# Patient Record
Sex: FEMALE | Race: WHITE | HISPANIC OR LATINO
[De-identification: names, ages, dates, MRNs, and addresses within clinical notes are randomized per-mention and may not be internally consistent; named-entity substitution may affect disease eponyms.]

---

## 2024-03-05 ENCOUNTER — NON-APPOINTMENT (OUTPATIENT)
Age: 34
End: 2024-03-05

## 2024-03-06 ENCOUNTER — TRANSCRIPTION ENCOUNTER (OUTPATIENT)
Age: 34
End: 2024-03-06

## 2024-03-06 ENCOUNTER — APPOINTMENT (OUTPATIENT)
Dept: OBGYN | Facility: CLINIC | Age: 34
End: 2024-03-06
Payer: COMMERCIAL

## 2024-03-06 VITALS
HEART RATE: 67 BPM | WEIGHT: 145 LBS | BODY MASS INDEX: 29.23 KG/M2 | HEIGHT: 59 IN | OXYGEN SATURATION: 100 % | SYSTOLIC BLOOD PRESSURE: 111 MMHG | DIASTOLIC BLOOD PRESSURE: 71 MMHG

## 2024-03-06 DIAGNOSIS — Z83.79 FAMILY HISTORY OF OTHER DISEASES OF THE DIGESTIVE SYSTEM: ICD-10-CM

## 2024-03-06 DIAGNOSIS — Z32.00 ENCOUNTER FOR PREGNANCY TEST, RESULT UNKNOWN: ICD-10-CM

## 2024-03-06 DIAGNOSIS — Z83.3 FAMILY HISTORY OF DIABETES MELLITUS: ICD-10-CM

## 2024-03-06 DIAGNOSIS — Z82.49 FAMILY HISTORY OF ISCHEMIC HEART DISEASE AND OTHER DISEASES OF THE CIRCULATORY SYSTEM: ICD-10-CM

## 2024-03-06 DIAGNOSIS — Z33.2 ENCOUNTER FOR ELECTIVE TERMINATION OF PREGNANCY: ICD-10-CM

## 2024-03-06 DIAGNOSIS — Z78.9 OTHER SPECIFIED HEALTH STATUS: ICD-10-CM

## 2024-03-06 DIAGNOSIS — N89.8 OTHER SPECIFIED NONINFLAMMATORY DISORDERS OF VAGINA: ICD-10-CM

## 2024-03-06 PROBLEM — Z00.00 ENCOUNTER FOR PREVENTIVE HEALTH EXAMINATION: Status: ACTIVE | Noted: 2024-03-06

## 2024-03-06 PROCEDURE — 99204 OFFICE O/P NEW MOD 45 MIN: CPT | Mod: 25

## 2024-03-06 PROCEDURE — 76830 TRANSVAGINAL US NON-OB: CPT

## 2024-03-06 NOTE — HISTORY OF PRESENT ILLNESS
[Patient reported PAP Smear was normal] : Patient reported PAP Smear was normal [N] : Patient denies prior pregnancies [Currently Active] : currently active [Men] : men [No] : No [PapSmeardate] : 2023 [FreeTextEntry1] : 01/27/2024

## 2024-03-10 PROBLEM — Z33.2 ENCOUNTER FOR ELECTIVE TERMINATION OF PREGNANCY: Status: ACTIVE | Noted: 2024-03-10

## 2024-03-10 PROBLEM — Z32.00 VISIT FOR CONFIRMATION OF PREGNANCY TEST RESULT WITH PHYSICAL EXAM: Status: ACTIVE | Noted: 2024-03-10

## 2024-03-10 NOTE — PROCEDURE
[Transvaginal OB Sonogram] : Transvaginal OB Sonogram [Intrauterine Pregnancy] : intrauterine pregnancy [Fetal Heart] : fetal heart present [Transvaginal OB Sonogram WNL] : Transvaginal OB Sonogram WNL

## 2024-03-10 NOTE — PHYSICAL EXAM
[Chaperone Present] : A chaperone was present in the examining room during all aspects of the physical examination [Alert] : alert [Appropriately responsive] : appropriately responsive [No Lymphadenopathy] : no lymphadenopathy [No Acute Distress] : no acute distress [Non-tender] : non-tender [Soft] : soft [Non-distended] : non-distended [No Lesions] : no lesions [No HSM] : No HSM [No Mass] : no mass [Oriented x3] : oriented x3 [Labia Majora] : normal [Labia Minora] : normal [Normal] : normal [Uterine Adnexae] : normal

## 2024-03-10 NOTE — COUNSELING
[Medication Management] : medication management [Pregnancy Options] : pregnancy options swelling of legs

## 2024-03-12 ENCOUNTER — TRANSCRIPTION ENCOUNTER (OUTPATIENT)
Age: 34
End: 2024-03-12

## 2024-03-15 DIAGNOSIS — B37.9 CANDIDIASIS, UNSPECIFIED: ICD-10-CM

## 2024-03-15 LAB
A VAGINAE DNA VAG QL NAA+PROBE: NORMAL
BVAB2 DNA VAG QL NAA+PROBE: NORMAL
C KRUSEI DNA VAG QL NAA+PROBE: NEGATIVE
C KRUSEI DNA VAG QL NAA+PROBE: POSITIVE
C TRACH RRNA SPEC QL NAA+PROBE: NEGATIVE
CANDIDA DNA VAG QL NAA+PROBE: NEGATIVE
MEGA1 DNA VAG QL NAA+PROBE: NORMAL
N GONORRHOEA RRNA SPEC QL NAA+PROBE: NEGATIVE
T VAGINALIS RRNA SPEC QL NAA+PROBE: NEGATIVE

## 2024-03-15 RX ORDER — FLUCONAZOLE 150 MG/1
150 TABLET ORAL
Qty: 3 | Refills: 0 | Status: ACTIVE | COMMUNITY
Start: 2024-03-15 | End: 1900-01-01

## 2024-03-22 ENCOUNTER — APPOINTMENT (OUTPATIENT)
Dept: OBGYN | Facility: CLINIC | Age: 34
End: 2024-03-22
Payer: COMMERCIAL

## 2024-03-22 VITALS — DIASTOLIC BLOOD PRESSURE: 67 MMHG | OXYGEN SATURATION: 97 % | SYSTOLIC BLOOD PRESSURE: 107 MMHG | HEART RATE: 77 BPM

## 2024-03-22 DIAGNOSIS — N92.6 IRREGULAR MENSTRUATION, UNSPECIFIED: ICD-10-CM

## 2024-03-22 DIAGNOSIS — O03.4 INCOMPLETE SPONTANEOUS ABORTION W/OUT COMPLICATION: ICD-10-CM

## 2024-03-22 PROCEDURE — 76830 TRANSVAGINAL US NON-OB: CPT

## 2024-03-22 PROCEDURE — 99213 OFFICE O/P EST LOW 20 MIN: CPT | Mod: 25

## 2024-03-22 PROCEDURE — 36415 COLL VENOUS BLD VENIPUNCTURE: CPT

## 2024-03-22 RX ORDER — MISOPROSTOL 200 UG/1
200 TABLET ORAL
Qty: 4 | Refills: 0 | Status: ACTIVE | COMMUNITY
Start: 2024-03-22 | End: 1900-01-01

## 2024-03-28 LAB — ABO + RH PNL BLD: NORMAL

## 2024-04-03 ENCOUNTER — TRANSCRIPTION ENCOUNTER (OUTPATIENT)
Age: 34
End: 2024-04-03

## 2024-04-22 ENCOUNTER — APPOINTMENT (OUTPATIENT)
Dept: OBGYN | Facility: CLINIC | Age: 34
End: 2024-04-22

## 2024-04-22 VITALS
WEIGHT: 145 LBS | BODY MASS INDEX: 29.29 KG/M2 | DIASTOLIC BLOOD PRESSURE: 79 MMHG | HEART RATE: 78 BPM | SYSTOLIC BLOOD PRESSURE: 116 MMHG | OXYGEN SATURATION: 100 %

## 2024-04-22 PROCEDURE — 99213 OFFICE O/P EST LOW 20 MIN: CPT | Mod: 25

## 2024-04-22 PROCEDURE — 36415 COLL VENOUS BLD VENIPUNCTURE: CPT

## 2024-04-22 PROCEDURE — 76830 TRANSVAGINAL US NON-OB: CPT

## 2024-04-23 LAB — HCG SERPL-MCNC: 438 MIU/ML

## 2024-06-20 PROBLEM — N92.6 MISSED MENSES: Status: ACTIVE | Noted: 2024-03-10

## 2024-06-20 NOTE — PROCEDURE
[F/U Medical ] : f/u medical  [Transvaginal Ultrasound] : transvaginal ultrasound [Color Doppler Imaging] : color doppler imaging [FreeTextEntry4] : Still some suspected retained POC, color doppler showed flow. Both ovaries visualized and appear to be normal; no cysts No adnexal masses. No free fluid Suspect incomplete

## 2024-06-20 NOTE — PHYSICAL EXAM
[Appropriately responsive] : appropriately responsive [Alert] : alert [No Acute Distress] : no acute distress [Soft] : soft [Non-tender] : non-tender [Non-distended] : non-distended [No HSM] : No HSM [No Lesions] : no lesions [No Mass] : no mass [Oriented x3] : oriented x3 [FreeTextEntry5] : No increased work of breathing noted [Labia Majora] : normal [Labia Minora] : normal [Normal] : normal [Uterine Adnexae] : normal

## 2024-06-20 NOTE — HISTORY OF PRESENT ILLNESS
[FreeTextEntry1] : Patient presents for a follow up visit. LMP:01/27/2024, regular cycles, q q 21 days in between Pt desired termination of pregnancy and is s/p mifepristone and misoprostol. Now here for f/u u/s. Denies VB now. Denies fever, chills, pelvic pain and abnormal vaginal discharge.

## 2024-06-20 NOTE — COUNSELING
[Vitamins/Supplements] : vitamins/supplements [Preconception Care/ Fertility options] : preconception care, fertility options [Pregnancy Options] : pregnancy options

## 2024-06-20 NOTE — PLAN
[FreeTextEntry1] : Here for f/u of medical . LMP 24 s/p mifepristone and misoprostol 3/10/24 No VB currently and no signs of infection. TVUS done today - Still some suspected retained POC, color doppler showed flow. Both ovaries visualized and appear to be normal; no cysts No adnexal masses. No free fluid Suspect incomplete   R/B/A of medical vs. surgical management of suspected retained POC. Pt had reported blood type to be Rh positive at last visit.  But was unable to find the document.  Blood type drawn today. After discussion, pt would like another round of misoprostol and will return in 1-2 weeks for repeat u/s. Rx misoprostol sent to pharmacy.

## 2024-08-04 PROBLEM — O03.9 COMPLETE ABORTION: Status: ACTIVE | Noted: 2024-08-04

## 2024-08-14 ENCOUNTER — APPOINTMENT (OUTPATIENT)
Dept: OBGYN | Facility: CLINIC | Age: 34
End: 2024-08-14

## 2024-08-14 VITALS
DIASTOLIC BLOOD PRESSURE: 81 MMHG | BODY MASS INDEX: 30.44 KG/M2 | HEART RATE: 82 BPM | WEIGHT: 151 LBS | OXYGEN SATURATION: 99 % | SYSTOLIC BLOOD PRESSURE: 128 MMHG | HEIGHT: 59 IN

## 2024-08-14 DIAGNOSIS — N89.8 OTHER SPECIFIED NONINFLAMMATORY DISORDERS OF VAGINA: ICD-10-CM

## 2024-08-14 DIAGNOSIS — N92.6 IRREGULAR MENSTRUATION, UNSPECIFIED: ICD-10-CM

## 2024-08-14 DIAGNOSIS — N92.0 EXCESSIVE AND FREQUENT MENSTRUATION WITH REGULAR CYCLE: ICD-10-CM

## 2024-08-14 DIAGNOSIS — B37.9 CANDIDIASIS, UNSPECIFIED: ICD-10-CM

## 2024-08-14 PROCEDURE — 36415 COLL VENOUS BLD VENIPUNCTURE: CPT

## 2024-08-14 PROCEDURE — 99214 OFFICE O/P EST MOD 30 MIN: CPT | Mod: 25

## 2024-08-14 PROCEDURE — 76830 TRANSVAGINAL US NON-OB: CPT

## 2024-08-14 RX ORDER — MEDROXYPROGESTERONE ACETATE 10 MG/1
10 TABLET ORAL DAILY
Qty: 10 | Refills: 0 | Status: ACTIVE | COMMUNITY
Start: 2024-08-14 | End: 1900-01-01

## 2024-08-14 NOTE — HISTORY OF PRESENT ILLNESS
[FreeTextEntry1] : 33 yo patient presents for follow up from medical . Since taking the medication she has had irregular bleeding from March-July. She describes constant spotting.  Thinks she had regular period 2024. Also c/o increased vaginal discharge - had odor to it, itching - 2 days ago.

## 2024-08-19 LAB
25(OH)D3 SERPL-MCNC: 19.3 NG/ML
A VAGINAE DNA VAG QL NAA+PROBE: NORMAL
ALBUMIN SERPL ELPH-MCNC: 4.5 G/DL
ALP BLD-CCNC: 67 U/L
ALT SERPL-CCNC: 21 U/L
ANION GAP SERPL CALC-SCNC: 11 MMOL/L
AST SERPL-CCNC: 15 U/L
BASOPHILS # BLD AUTO: 0.1 K/UL
BASOPHILS NFR BLD AUTO: 0.8 %
BILIRUB SERPL-MCNC: 0.6 MG/DL
BUN SERPL-MCNC: 17 MG/DL
BVAB2 DNA VAG QL NAA+PROBE: NORMAL
C KRUSEI DNA VAG QL NAA+PROBE: NEGATIVE
C KRUSEI DNA VAG QL NAA+PROBE: POSITIVE
C TRACH RRNA SPEC QL NAA+PROBE: NEGATIVE
CALCIUM SERPL-MCNC: 9.9 MG/DL
CANDIDA DNA VAG QL NAA+PROBE: NEGATIVE
CHLORIDE SERPL-SCNC: 105 MMOL/L
CHOLEST SERPL-MCNC: 241 MG/DL
CO2 SERPL-SCNC: 22 MMOL/L
CREAT SERPL-MCNC: 0.68 MG/DL
EGFR: 118 ML/MIN/1.73M2
EOSINOPHIL # BLD AUTO: 0.12 K/UL
EOSINOPHIL NFR BLD AUTO: 0.9 %
ESTIMATED AVERAGE GLUCOSE: 105 MG/DL
GLUCOSE SERPL-MCNC: 97 MG/DL
HBA1C MFR BLD HPLC: 5.3 %
HCG SERPL-MCNC: <1 MIU/ML
HCT VFR BLD CALC: 45.9 %
HDLC SERPL-MCNC: 77 MG/DL
HGB BLD-MCNC: 13.4 G/DL
IMM GRANULOCYTES NFR BLD AUTO: 0.3 %
IRON SATN MFR SERPL: 24 %
IRON SERPL-MCNC: 78 UG/DL
LDLC SERPL CALC-MCNC: 153 MG/DL
LYMPHOCYTES # BLD AUTO: 4.13 K/UL
LYMPHOCYTES NFR BLD AUTO: 31.7 %
MAN DIFF?: NORMAL
MCHC RBC-ENTMCNC: 29.2 GM/DL
MCHC RBC-ENTMCNC: 29.5 PG
MCV RBC AUTO: 100.9 FL
MEGA1 DNA VAG QL NAA+PROBE: NORMAL
MONOCYTES # BLD AUTO: 0.66 K/UL
MONOCYTES NFR BLD AUTO: 5.1 %
N GONORRHOEA RRNA SPEC QL NAA+PROBE: NEGATIVE
NEUTROPHILS # BLD AUTO: 7.99 K/UL
NEUTROPHILS NFR BLD AUTO: 61.2 %
NONHDLC SERPL-MCNC: 163 MG/DL
PLATELET # BLD AUTO: 361 K/UL
POTASSIUM SERPL-SCNC: 4.7 MMOL/L
PROGEST SERPL-MCNC: 15.4 NG/ML
PROT SERPL-MCNC: 7.1 G/DL
RBC # BLD: 4.55 M/UL
RBC # FLD: 14.2 %
SODIUM SERPL-SCNC: 138 MMOL/L
T VAGINALIS RRNA SPEC QL NAA+PROBE: NEGATIVE
TIBC SERPL-MCNC: 321 UG/DL
TRIGL SERPL-MCNC: 60 MG/DL
TSH SERPL-ACNC: 2.18 UIU/ML
UIBC SERPL-MCNC: 243 UG/DL
VIT B12 SERPL-MCNC: 490 PG/ML
WBC # FLD AUTO: 13.04 K/UL

## 2024-10-28 ENCOUNTER — APPOINTMENT (OUTPATIENT)
Dept: OBGYN | Facility: CLINIC | Age: 34
End: 2024-10-28

## 2024-10-28 ENCOUNTER — NON-APPOINTMENT (OUTPATIENT)
Age: 34
End: 2024-10-28

## 2024-10-28 VITALS
OXYGEN SATURATION: 100 % | WEIGHT: 155 LBS | DIASTOLIC BLOOD PRESSURE: 70 MMHG | HEART RATE: 76 BPM | BODY MASS INDEX: 31.31 KG/M2 | SYSTOLIC BLOOD PRESSURE: 111 MMHG

## 2024-10-28 DIAGNOSIS — N92.6 IRREGULAR MENSTRUATION, UNSPECIFIED: ICD-10-CM

## 2024-10-28 DIAGNOSIS — Z32.00 ENCOUNTER FOR PREGNANCY TEST, RESULT UNKNOWN: ICD-10-CM

## 2024-10-28 DIAGNOSIS — B37.9 CANDIDIASIS, UNSPECIFIED: ICD-10-CM

## 2024-10-28 DIAGNOSIS — N89.8 OTHER SPECIFIED NONINFLAMMATORY DISORDERS OF VAGINA: ICD-10-CM

## 2024-10-28 DIAGNOSIS — Z13.71 ENCOUNTER FOR NONPROCREATIVE SCREENING FOR GENETIC DISEASE CARRIER STATUS: ICD-10-CM

## 2024-10-28 PROCEDURE — 81025 URINE PREGNANCY TEST: CPT

## 2024-10-28 PROCEDURE — 99214 OFFICE O/P EST MOD 30 MIN: CPT | Mod: 25

## 2024-10-28 PROCEDURE — 36415 COLL VENOUS BLD VENIPUNCTURE: CPT

## 2024-10-28 PROCEDURE — 76830 TRANSVAGINAL US NON-OB: CPT

## 2024-10-28 RX ORDER — MICONAZOLE NITRATE 4 %
2 CREAM WITH PREFILLED APPLICATOR VAGINAL
Qty: 1 | Refills: 0 | Status: ACTIVE | COMMUNITY
Start: 2024-10-28 | End: 1900-01-01

## 2024-10-29 LAB
ABO + RH PNL BLD: NORMAL
ALBUMIN SERPL ELPH-MCNC: 4.5 G/DL
ALP BLD-CCNC: 55 U/L
ALT SERPL-CCNC: 18 U/L
ANION GAP SERPL CALC-SCNC: 14 MMOL/L
AST SERPL-CCNC: 17 U/L
BILIRUB SERPL-MCNC: 0.4 MG/DL
BLD GP AB SCN SERPL QL: NORMAL
BUN SERPL-MCNC: 12 MG/DL
C TRACH RRNA SPEC QL NAA+PROBE: NOT DETECTED
CALCIUM SERPL-MCNC: 9.6 MG/DL
CHLORIDE SERPL-SCNC: 103 MMOL/L
CMV IGG SERPL QL: 1.6 U/ML
CMV IGG SERPL-IMP: POSITIVE
CMV IGM SERPL QL: <8 AU/ML
CMV IGM SERPL QL: NEGATIVE
CO2 SERPL-SCNC: 20 MMOL/L
CREAT SERPL-MCNC: 0.54 MG/DL
EGFR: 125 ML/MIN/1.73M2
ESTIMATED AVERAGE GLUCOSE: 97 MG/DL
GLUCOSE SERPL-MCNC: 82 MG/DL
HBA1C MFR BLD HPLC: 5 %
HBV SURFACE AG SER QL: NONREACTIVE
HCT VFR BLD CALC: 42.3 %
HCV AB SER QL: NONREACTIVE
HCV RNA SERPL NAA+PROBE-LOG IU: NOT DETECTED LOGIU/ML
HCV S/CO RATIO: 0.08 S/CO
HEPC RNA INTERP: NOT DETECTED
HGB A MFR BLD: 97.2 %
HGB A2 MFR BLD: 2.8 %
HGB BLD-MCNC: 13.7 G/DL
HGB FRACT BLD-IMP: NORMAL
HIV1+2 AB SPEC QL IA.RAPID: NONREACTIVE
MCHC RBC-ENTMCNC: 31.7 PG
MCHC RBC-ENTMCNC: 32.4 GM/DL
MCV RBC AUTO: 97.9 FL
MEV IGG FLD QL IA: >300 AU/ML
MEV IGG+IGM SER-IMP: POSITIVE
MUV AB SER-ACNC: POSITIVE
MUV IGG SER QL IA: 102 AU/ML
N GONORRHOEA RRNA SPEC QL NAA+PROBE: NOT DETECTED
PLATELET # BLD AUTO: 369 K/UL
POTASSIUM SERPL-SCNC: 4.2 MMOL/L
PROT SERPL-MCNC: 7.1 G/DL
RBC # BLD: 4.32 M/UL
RBC # FLD: 13.3 %
RUBV IGG FLD-ACNC: 3.16 INDEX
RUBV IGG SER-IMP: POSITIVE
SODIUM SERPL-SCNC: 138 MMOL/L
SOURCE AMPLIFICATION: NORMAL
T GONDII AB SER-IMP: NEGATIVE
T GONDII AB SER-IMP: NEGATIVE
T GONDII IGG SER QL: <3 IU/ML
T GONDII IGM SER QL: <3 AU/ML
T PALLIDUM AB SER QL IA: NEGATIVE
TSH SERPL-ACNC: 1.55 UIU/ML
VZV AB TITR SER: POSITIVE
VZV IGG SER IF-ACNC: 12.8 S/CO
WBC # FLD AUTO: 10.66 K/UL

## 2024-10-30 LAB
B19V IGG SER QL IA: 0.25 INDEX
B19V IGG+IGM SER-IMP: NEGATIVE
B19V IGG+IGM SER-IMP: NORMAL
B19V IGM FLD-ACNC: 0.21 INDEX
B19V IGM SER-ACNC: NEGATIVE

## 2024-10-31 LAB — BACTERIA UR CULT: ABNORMAL

## 2024-11-18 ENCOUNTER — TRANSCRIPTION ENCOUNTER (OUTPATIENT)
Age: 34
End: 2024-11-18

## 2024-11-20 ENCOUNTER — APPOINTMENT (OUTPATIENT)
Dept: OBGYN | Facility: CLINIC | Age: 34
End: 2024-11-20
Payer: COMMERCIAL

## 2024-11-20 VITALS
SYSTOLIC BLOOD PRESSURE: 111 MMHG | HEART RATE: 90 BPM | DIASTOLIC BLOOD PRESSURE: 71 MMHG | BODY MASS INDEX: 31.31 KG/M2 | OXYGEN SATURATION: 98 % | WEIGHT: 155 LBS

## 2024-11-20 DIAGNOSIS — N89.8 OTHER SPECIFIED NONINFLAMMATORY DISORDERS OF VAGINA: ICD-10-CM

## 2024-11-20 DIAGNOSIS — B37.9 CANDIDIASIS, UNSPECIFIED: ICD-10-CM

## 2024-11-20 DIAGNOSIS — O23.40 UNSPECIFIED INFECTION OF URINARY TRACT IN PREGNANCY, UNSPECIFIED TRIMESTER: ICD-10-CM

## 2024-11-20 PROCEDURE — 0502F SUBSEQUENT PRENATAL CARE: CPT

## 2024-11-22 LAB
BV BACTERIA RRNA VAG QL NAA+PROBE: NOT DETECTED
C GLABRATA RNA VAG QL NAA+PROBE: NOT DETECTED
CANDIDA RRNA VAG QL PROBE: NOT DETECTED
T VAGINALIS RRNA SPEC QL NAA+PROBE: NOT DETECTED

## 2024-11-24 LAB — BACTERIA UR CULT: NORMAL

## 2024-12-02 ENCOUNTER — NON-APPOINTMENT (OUTPATIENT)
Age: 34
End: 2024-12-02

## 2024-12-02 ENCOUNTER — TRANSCRIPTION ENCOUNTER (OUTPATIENT)
Age: 34
End: 2024-12-02

## 2024-12-03 ENCOUNTER — TRANSCRIPTION ENCOUNTER (OUTPATIENT)
Age: 34
End: 2024-12-03

## 2024-12-03 RX ORDER — CEPHALEXIN 500 MG/1
500 CAPSULE ORAL
Qty: 14 | Refills: 0 | Status: ACTIVE | COMMUNITY
Start: 2024-12-03 | End: 1900-01-01

## 2024-12-05 ENCOUNTER — ASOB RESULT (OUTPATIENT)
Age: 34
End: 2024-12-05

## 2024-12-05 ENCOUNTER — APPOINTMENT (OUTPATIENT)
Dept: ANTEPARTUM | Facility: CLINIC | Age: 34
End: 2024-12-05
Payer: COMMERCIAL

## 2024-12-05 PROCEDURE — 76813 OB US NUCHAL MEAS 1 GEST: CPT

## 2024-12-05 PROCEDURE — 93976 VASCULAR STUDY: CPT

## 2024-12-05 PROCEDURE — 76801 OB US < 14 WKS SINGLE FETUS: CPT | Mod: 59

## 2024-12-17 ENCOUNTER — APPOINTMENT (OUTPATIENT)
Dept: OBGYN | Facility: CLINIC | Age: 34
End: 2024-12-17
Payer: COMMERCIAL

## 2024-12-17 PROCEDURE — 0502F SUBSEQUENT PRENATAL CARE: CPT

## 2025-01-14 ENCOUNTER — APPOINTMENT (OUTPATIENT)
Dept: OBGYN | Facility: CLINIC | Age: 35
End: 2025-01-14

## 2025-01-14 VITALS
OXYGEN SATURATION: 98 % | DIASTOLIC BLOOD PRESSURE: 74 MMHG | BODY MASS INDEX: 34.13 KG/M2 | HEART RATE: 84 BPM | SYSTOLIC BLOOD PRESSURE: 113 MMHG | WEIGHT: 169 LBS

## 2025-01-14 DIAGNOSIS — Z13.79 ENCOUNTER FOR OTHER SCREENING FOR GENETIC AND CHROMOSOMAL ANOMALIES: ICD-10-CM

## 2025-01-14 DIAGNOSIS — Z34.02 ENCOUNTER FOR SUPERVISION OF NORMAL FIRST PREGNANCY, SECOND TRIMESTER: ICD-10-CM

## 2025-01-14 PROCEDURE — 36415 COLL VENOUS BLD VENIPUNCTURE: CPT

## 2025-01-14 PROCEDURE — 0502F SUBSEQUENT PRENATAL CARE: CPT

## 2025-01-15 ENCOUNTER — TRANSCRIPTION ENCOUNTER (OUTPATIENT)
Age: 35
End: 2025-01-15

## 2025-01-15 LAB
AFP INTERP SERPL-IMP: NORMAL
AFP INTERP SERPL-IMP: NORMAL
AFP MOM CUT-OFF: 2.5
AFP MOM SERPL: 0.98
AFP PERCENTILE: 47.3
AFP SERPL-ACNC: 41.48 NG/ML
CARBAMAZEPINE?: NO
CURRENT SMOKER: NORMAL
DIABETES STATUS PATIENT: NORMAL
GA: NORMAL
GESTATIONAL AGE METHOD: NORMAL
HX OF NTD NARR: NORMAL
MULTIPLE PREGNANCY: NORMAL
NEURAL TUBE DEFECT RISK FETUS: NORMAL
NEURAL TUBE DEFECT RISK POP: NORMAL
RECOM F/U: NO
TEST PERFORMANCE INFO SPEC: NORMAL
VALPROIC ACID?: NORMAL

## 2025-01-28 ENCOUNTER — ASOB RESULT (OUTPATIENT)
Age: 35
End: 2025-01-28

## 2025-01-28 ENCOUNTER — APPOINTMENT (OUTPATIENT)
Dept: ANTEPARTUM | Facility: CLINIC | Age: 35
End: 2025-01-28

## 2025-01-28 PROCEDURE — 76817 TRANSVAGINAL US OBSTETRIC: CPT

## 2025-01-28 PROCEDURE — 76811 OB US DETAILED SNGL FETUS: CPT

## 2025-02-11 ENCOUNTER — NON-APPOINTMENT (OUTPATIENT)
Age: 35
End: 2025-02-11

## 2025-02-11 ENCOUNTER — APPOINTMENT (OUTPATIENT)
Dept: OBGYN | Facility: CLINIC | Age: 35
End: 2025-02-11
Payer: COMMERCIAL

## 2025-02-11 VITALS
HEART RATE: 114 BPM | BODY MASS INDEX: 35.35 KG/M2 | SYSTOLIC BLOOD PRESSURE: 111 MMHG | WEIGHT: 175 LBS | OXYGEN SATURATION: 97 % | DIASTOLIC BLOOD PRESSURE: 71 MMHG

## 2025-02-11 PROCEDURE — 0502F SUBSEQUENT PRENATAL CARE: CPT

## 2025-02-26 ENCOUNTER — APPOINTMENT (OUTPATIENT)
Dept: OBGYN | Facility: CLINIC | Age: 35
End: 2025-02-26
Payer: COMMERCIAL

## 2025-02-26 VITALS
OXYGEN SATURATION: 97 % | SYSTOLIC BLOOD PRESSURE: 120 MMHG | DIASTOLIC BLOOD PRESSURE: 75 MMHG | BODY MASS INDEX: 35.14 KG/M2 | WEIGHT: 174 LBS | HEART RATE: 95 BPM

## 2025-02-26 DIAGNOSIS — O03.9 COMPLETE OR UNSPECIFIED SPONTANEOUS ABORTION W/OUT COMPLICATION: ICD-10-CM

## 2025-02-26 DIAGNOSIS — O03.4 INCOMPLETE SPONTANEOUS ABORTION W/OUT COMPLICATION: ICD-10-CM

## 2025-02-26 DIAGNOSIS — Z33.2 ENCOUNTER FOR ELECTIVE TERMINATION OF PREGNANCY: ICD-10-CM

## 2025-02-26 DIAGNOSIS — O23.40 UNSPECIFIED INFECTION OF URINARY TRACT IN PREGNANCY, UNSPECIFIED TRIMESTER: ICD-10-CM

## 2025-02-26 DIAGNOSIS — Z34.02 ENCOUNTER FOR SUPERVISION OF NORMAL FIRST PREGNANCY, SECOND TRIMESTER: ICD-10-CM

## 2025-02-26 DIAGNOSIS — Z13.1 ENCOUNTER FOR SCREENING FOR DIABETES MELLITUS: ICD-10-CM

## 2025-02-26 DIAGNOSIS — Z87.42 PERSONAL HISTORY OF OTHER DISEASES OF THE FEMALE GENITAL TRACT: ICD-10-CM

## 2025-02-26 DIAGNOSIS — N92.0 EXCESSIVE AND FREQUENT MENSTRUATION WITH REGULAR CYCLE: ICD-10-CM

## 2025-02-26 PROCEDURE — 0502F SUBSEQUENT PRENATAL CARE: CPT

## 2025-02-26 PROCEDURE — 81003 URINALYSIS AUTO W/O SCOPE: CPT | Mod: QW

## 2025-02-26 PROCEDURE — 36415 COLL VENOUS BLD VENIPUNCTURE: CPT

## 2025-02-27 ENCOUNTER — APPOINTMENT (OUTPATIENT)
Dept: ANTEPARTUM | Facility: CLINIC | Age: 35
End: 2025-02-27
Payer: COMMERCIAL

## 2025-02-27 ENCOUNTER — ASOB RESULT (OUTPATIENT)
Age: 35
End: 2025-02-27

## 2025-02-27 PROCEDURE — 76816 OB US FOLLOW-UP PER FETUS: CPT

## 2025-02-27 PROCEDURE — 76817 TRANSVAGINAL US OBSTETRIC: CPT

## 2025-03-03 ENCOUNTER — TRANSCRIPTION ENCOUNTER (OUTPATIENT)
Age: 35
End: 2025-03-03

## 2025-03-03 LAB
GLUCOSE 1H P 50 G GLC PO SERPL-MCNC: 111 MG/DL
HCT VFR BLD CALC: 38 %
HGB BLD-MCNC: 11.8 G/DL
MCHC RBC-ENTMCNC: 30.9 PG
MCHC RBC-ENTMCNC: 31.1 G/DL
MCV RBC AUTO: 99.5 FL
PLATELET # BLD AUTO: 403 K/UL
RBC # BLD: 3.82 M/UL
RBC # FLD: 14.2 %
T PALLIDUM AB SER QL IA: NEGATIVE
WBC # FLD AUTO: 12.55 K/UL

## 2025-03-07 ENCOUNTER — TRANSCRIPTION ENCOUNTER (OUTPATIENT)
Age: 35
End: 2025-03-07

## 2025-03-26 ENCOUNTER — NON-APPOINTMENT (OUTPATIENT)
Age: 35
End: 2025-03-26

## 2025-03-26 ENCOUNTER — APPOINTMENT (OUTPATIENT)
Dept: OBGYN | Facility: CLINIC | Age: 35
End: 2025-03-26
Payer: COMMERCIAL

## 2025-03-26 VITALS
HEIGHT: 59 IN | WEIGHT: 177 LBS | DIASTOLIC BLOOD PRESSURE: 74 MMHG | HEART RATE: 77 BPM | SYSTOLIC BLOOD PRESSURE: 110 MMHG | BODY MASS INDEX: 35.68 KG/M2 | OXYGEN SATURATION: 98 %

## 2025-03-26 DIAGNOSIS — Z23 ENCOUNTER FOR IMMUNIZATION: ICD-10-CM

## 2025-03-26 PROCEDURE — 0502F SUBSEQUENT PRENATAL CARE: CPT

## 2025-04-15 ENCOUNTER — APPOINTMENT (OUTPATIENT)
Dept: OBGYN | Facility: CLINIC | Age: 35
End: 2025-04-15
Payer: COMMERCIAL

## 2025-04-15 PROCEDURE — 0502F SUBSEQUENT PRENATAL CARE: CPT

## 2025-04-21 ENCOUNTER — APPOINTMENT (OUTPATIENT)
Dept: ANTEPARTUM | Facility: CLINIC | Age: 35
End: 2025-04-21
Payer: COMMERCIAL

## 2025-04-21 ENCOUNTER — ASOB RESULT (OUTPATIENT)
Age: 35
End: 2025-04-21

## 2025-04-21 PROCEDURE — 76820 UMBILICAL ARTERY ECHO: CPT | Mod: 59

## 2025-04-21 PROCEDURE — 76819 FETAL BIOPHYS PROFIL W/O NST: CPT | Mod: 59

## 2025-04-21 PROCEDURE — 76816 OB US FOLLOW-UP PER FETUS: CPT

## 2025-04-23 ENCOUNTER — APPOINTMENT (OUTPATIENT)
Dept: OBGYN | Facility: CLINIC | Age: 35
End: 2025-04-23
Payer: COMMERCIAL

## 2025-04-23 PROCEDURE — 36415 COLL VENOUS BLD VENIPUNCTURE: CPT

## 2025-04-24 ENCOUNTER — NON-APPOINTMENT (OUTPATIENT)
Age: 35
End: 2025-04-24

## 2025-04-24 LAB — GLUCOSE 1H P 50 G GLC PO SERPL-MCNC: 163 MG/DL

## 2025-04-25 ENCOUNTER — APPOINTMENT (OUTPATIENT)
Dept: OBGYN | Facility: CLINIC | Age: 35
End: 2025-04-25
Payer: COMMERCIAL

## 2025-04-25 DIAGNOSIS — O99.810 ABNORMAL GLUCOSE COMPLICATING PREGNANCY: ICD-10-CM

## 2025-04-25 PROCEDURE — 36415 COLL VENOUS BLD VENIPUNCTURE: CPT

## 2025-05-05 ENCOUNTER — NON-APPOINTMENT (OUTPATIENT)
Age: 35
End: 2025-05-05

## 2025-05-05 ENCOUNTER — TRANSCRIPTION ENCOUNTER (OUTPATIENT)
Age: 35
End: 2025-05-05

## 2025-05-07 ENCOUNTER — NON-APPOINTMENT (OUTPATIENT)
Age: 35
End: 2025-05-07

## 2025-05-07 ENCOUNTER — APPOINTMENT (OUTPATIENT)
Dept: OBGYN | Facility: CLINIC | Age: 35
End: 2025-05-07
Payer: COMMERCIAL

## 2025-05-07 VITALS
BODY MASS INDEX: 37.77 KG/M2 | SYSTOLIC BLOOD PRESSURE: 97 MMHG | WEIGHT: 187 LBS | DIASTOLIC BLOOD PRESSURE: 61 MMHG | HEART RATE: 100 BPM | OXYGEN SATURATION: 98 %

## 2025-05-07 PROCEDURE — 0502F SUBSEQUENT PRENATAL CARE: CPT

## 2025-05-08 ENCOUNTER — TRANSCRIPTION ENCOUNTER (OUTPATIENT)
Age: 35
End: 2025-05-08

## 2025-05-12 ENCOUNTER — ASOB RESULT (OUTPATIENT)
Age: 35
End: 2025-05-12

## 2025-05-12 ENCOUNTER — APPOINTMENT (OUTPATIENT)
Dept: ANTEPARTUM | Facility: CLINIC | Age: 35
End: 2025-05-12
Payer: COMMERCIAL

## 2025-05-12 PROCEDURE — 76816 OB US FOLLOW-UP PER FETUS: CPT

## 2025-05-12 PROCEDURE — 76819 FETAL BIOPHYS PROFIL W/O NST: CPT | Mod: 59

## 2025-05-12 PROCEDURE — 76820 UMBILICAL ARTERY ECHO: CPT | Mod: 59

## 2025-05-21 ENCOUNTER — APPOINTMENT (OUTPATIENT)
Dept: OBGYN | Facility: CLINIC | Age: 35
End: 2025-05-21

## 2025-05-21 DIAGNOSIS — Z34.93 ENCOUNTER FOR SUPERVISION OF NORMAL PREGNANCY, UNSPECIFIED, THIRD TRIMESTER: ICD-10-CM

## 2025-05-21 PROCEDURE — 0502F SUBSEQUENT PRENATAL CARE: CPT

## 2025-05-21 PROCEDURE — 36415 COLL VENOUS BLD VENIPUNCTURE: CPT

## 2025-05-27 ENCOUNTER — APPOINTMENT (OUTPATIENT)
Dept: OBGYN | Facility: CLINIC | Age: 35
End: 2025-05-27
Payer: COMMERCIAL

## 2025-05-27 LAB
B-HEM STREP SPEC QL CULT: ABNORMAL
BASOPHILS # BLD AUTO: 0.05 K/UL
BASOPHILS NFR BLD AUTO: 0.4 %
EOSINOPHIL # BLD AUTO: 0.05 K/UL
EOSINOPHIL NFR BLD AUTO: 0.4 %
HBV SURFACE AG SER QL: NONREACTIVE
HCT VFR BLD CALC: 38.3 %
HCV AB SER QL: NONREACTIVE
HCV S/CO RATIO: 0.11 S/CO
HGB BLD-MCNC: 12 G/DL
HIV1+2 AB SPEC QL IA.RAPID: NONREACTIVE
IMM GRANULOCYTES NFR BLD AUTO: 0.9 %
LYMPHOCYTES # BLD AUTO: 2.63 K/UL
LYMPHOCYTES NFR BLD AUTO: 22.9 %
MAN DIFF?: NORMAL
MCHC RBC-ENTMCNC: 28.3 PG
MCHC RBC-ENTMCNC: 31.3 G/DL
MCV RBC AUTO: 90.3 FL
MONOCYTES # BLD AUTO: 0.98 K/UL
MONOCYTES NFR BLD AUTO: 8.5 %
NEUTROPHILS # BLD AUTO: 7.68 K/UL
NEUTROPHILS NFR BLD AUTO: 66.9 %
PLATELET # BLD AUTO: 345 K/UL
RBC # BLD: 4.24 M/UL
RBC # FLD: 14.7 %
T PALLIDUM AB SER QL IA: NEGATIVE
WBC # FLD AUTO: 11.49 K/UL

## 2025-05-27 PROCEDURE — 0502F SUBSEQUENT PRENATAL CARE: CPT

## 2025-05-29 ENCOUNTER — OUTPATIENT (OUTPATIENT)
Dept: OUTPATIENT SERVICES | Facility: HOSPITAL | Age: 35
LOS: 1 days | End: 2025-05-29
Payer: COMMERCIAL

## 2025-05-29 VITALS
HEART RATE: 95 BPM | RESPIRATION RATE: 18 BRPM | DIASTOLIC BLOOD PRESSURE: 72 MMHG | TEMPERATURE: 98 F | SYSTOLIC BLOOD PRESSURE: 117 MMHG

## 2025-05-29 DIAGNOSIS — O26.899 OTHER SPECIFIED PREGNANCY RELATED CONDITIONS, UNSPECIFIED TRIMESTER: ICD-10-CM

## 2025-05-29 LAB — AMNISURE ROM (RUPTURE OF MEMBRANES): NEGATIVE — SIGNIFICANT CHANGE UP

## 2025-05-29 PROCEDURE — 76815 OB US LIMITED FETUS(S): CPT

## 2025-05-29 PROCEDURE — 76818 FETAL BIOPHYS PROFILE W/NST: CPT

## 2025-05-29 PROCEDURE — 59025 FETAL NON-STRESS TEST: CPT

## 2025-05-29 PROCEDURE — 83986 ASSAY PH BODY FLUID NOS: CPT

## 2025-05-29 PROCEDURE — 84112 EVAL AMNIOTIC FLUID PROTEIN: CPT

## 2025-05-29 PROCEDURE — 99214 OFFICE O/P EST MOD 30 MIN: CPT

## 2025-05-29 NOTE — OB PROVIDER TRIAGE NOTE - HISTORY OF PRESENT ILLNESS
36 y/o  at 37w3d presenting for evaluation for rupture of membranes and intermittent painful contractions. Per patient, she endorses feeling increased discharge since yesterday AM; she says the discharge has occurred intermittently and is clear-whitish. She also has had intermittent painful contractions; she says they are happening "every 12 minutes or so". She is not breathing hard through contractions. She does not want an epidural at this time. She denies vaginal bleeding. She endorses normal fetal movement.    Ante: Spontaneous pregnancy. NIPT and anatomy scan wnl. Passed GCT. Repeated GCT in the setting of macrosomia, failed GCT and passed GTT. Denies elevated blood pressures in this pregnancy.  [place] : Cephalic. Anterior placenta. ANDREZ 10.34. EFW 3708 g >99%, AC >99%  GBS positive    OBHX:  G1 -  TOP  G2 - Current  GynHX: Hx of abnormal Pap smear (s/p LEEP in ). Denies fibroids, ovarian cysts, STI/herpes.   MedHX: denies  Surghx: denies  Medications: denies  Allergies: NKDA    Physical Exam:  T(C): 36.7 (25 @ 14:09), Max: 36.7 (25 @ 14:09)  HR: 95 (25 @ 14:09) (95 - 95)  BP: 117/72 (25 @ 14:09) (117/72 - 117/72)  RR: 18 (25 @ 14:09) (18 - 18)  SpO2: --    General: NAD  Pulm: no increased WOB  Abdomen: soft, gravid, nontender  Extremities: wnl   TAUS: Cephalic. Placenta anterior. ANDREZ 11 cm. BPP 8/8.   SSE: no pooling seen in vaginal vault, negative nitrazine, negative ferning  VE: 2-3/50/-3    EFM: 135 bpm, mod variability, + accels, - decels; reactive and reassuring  [place]: No ctx seen on toco    A/p:  36 y/o  at 37w3d presenting for evaluation for rupture of membranes and intermittent painful contractions.  - Given negative Amnisure, negative nitrazine, negative ferning, no pooling in vaginal vault,and appropriate ANDREZ, low concern at this time for PROM. On initial exam, VE 2-3/50/-3; patient re-examined about 2 hours after initial exam was unchanged. At this time, patient likely in very early labor. Patient does not want an epidural at this time. She feels safe to labor at home. At time of evaluation, patient was clinically and hemodynamically stable for discharge. Patient heavily counseled on strict return precautions.  - Fetal status is reassuring given BPP 8/8, appropriate ANDREZ, reactive and reassuring NST  - Ultrasound attached in chart  - Discharged with strict return precautions    Discussed with Dr. Mtz and Dr. Stevie Jon PGY1

## 2025-05-29 NOTE — OB RN TRIAGE NOTE - CHIEF COMPLAINT QUOTE
"I am having contractions since yesterday, they more intense a=since 6 am today and now about every 12 minutes"

## 2025-05-29 NOTE — OB RN TRIAGE NOTE - FALL HARM RISK - UNIVERSAL INTERVENTIONS
Bed in lowest position, wheels locked, appropriate side rails in place/Call bell, personal items and telephone in reach/Instruct patient to call for assistance before getting out of bed or chair/Non-slip footwear when patient is out of bed/[place] to call system/Physically safe environment - no spills, clutter or unnecessary equipment/Purposeful Proactive Rounding/Room/bathroom lighting operational, light cord in reach

## 2025-05-30 ENCOUNTER — OUTPATIENT (OUTPATIENT)
Dept: OUTPATIENT SERVICES | Facility: HOSPITAL | Age: 35
LOS: 1 days | End: 2025-05-30
Payer: COMMERCIAL

## 2025-05-30 VITALS
DIASTOLIC BLOOD PRESSURE: 77 MMHG | HEART RATE: 89 BPM | TEMPERATURE: 98 F | SYSTOLIC BLOOD PRESSURE: 127 MMHG | RESPIRATION RATE: 16 BRPM | OXYGEN SATURATION: 97 %

## 2025-05-30 DIAGNOSIS — Z03.71 ENCOUNTER FOR SUSPECTED PROBLEM WITH AMNIOTIC CAVITY AND MEMBRANE RULED OUT: ICD-10-CM

## 2025-05-30 DIAGNOSIS — O47.1 FALSE LABOR AT OR AFTER 37 COMPLETED WEEKS OF GESTATION: ICD-10-CM

## 2025-05-30 DIAGNOSIS — O26.899 OTHER SPECIFIED PREGNANCY RELATED CONDITIONS, UNSPECIFIED TRIMESTER: ICD-10-CM

## 2025-05-30 DIAGNOSIS — Z3A.37 37 WEEKS GESTATION OF PREGNANCY: ICD-10-CM

## 2025-05-30 PROCEDURE — 59025 FETAL NON-STRESS TEST: CPT | Mod: 26,59

## 2025-05-30 PROCEDURE — 76818 FETAL BIOPHYS PROFILE W/NST: CPT

## 2025-05-30 PROCEDURE — 59025 FETAL NON-STRESS TEST: CPT

## 2025-05-30 PROCEDURE — 76818 FETAL BIOPHYS PROFILE W/NST: CPT | Mod: 26

## 2025-05-30 PROCEDURE — 99214 OFFICE O/P EST MOD 30 MIN: CPT

## 2025-05-30 PROCEDURE — 76815 OB US LIMITED FETUS(S): CPT | Mod: 26

## 2025-05-30 PROCEDURE — 99221 1ST HOSP IP/OBS SF/LOW 40: CPT | Mod: 25

## 2025-05-30 PROCEDURE — 76815 OB US LIMITED FETUS(S): CPT

## 2025-05-30 NOTE — OB RN TRIAGE NOTE - FALL HARM RISK - UNIVERSAL INTERVENTIONS
Bed in lowest position, wheels locked, appropriate side rails in place/Call bell, personal items and telephone in reach/Instruct patient to call for assistance before getting out of bed or chair/Non-slip footwear when patient is out of bed/[place] to call system/Physically safe environment - no spills, clutter or unnecessary equipment/Purposeful Proactive Rounding/Room/bathroom lighting operational, light cord in reach

## 2025-05-30 NOTE — OB PROVIDER TRIAGE NOTE - HISTORY OF PRESENT ILLNESS
MCKENNA [id number]  S: 35yFemale  at 37w4d presenting due to painful contractions x 48 hours. Pt reports she was here in triage yesterday for the same reason, w/ contractions 8-12 minutes apart. Pt reports she was sent home when her cervical exam was unchanged and presented again today when contractions are now 4-5 minutes apart and more painful.   Of note, blood pressure noted in triage yesterday to be elevated, patients arm was bent as repeat BP was WNL.     Reports +FM, no LOF/VB/CTX. Denies HA, vision changes, RUQ/epigastric pain.     Ante: Spontaneous pregnancy. NIPT and anatomy scan wnl. Passed GCT. Repeated GCT in the setting of macrosomia, failed GCT and passed GTT. Denies elevated blood pressures in this pregnancy.  [place] : EFW 3708 g >99%, AC >99%  GBS positive    OBHX:  G1 -  TOP  G2 - Current  GynHX: Hx of abnormal Pap smear (s/p LEEP in ). Denies fibroids, ovarian cysts, STI/herpes.   MedHX: denies  Surghx: denies  Medications: denies  Allergies: NKDA      PE  T(C): 36.5 (25 @ 09:46), Max: 36.7 (25 @ 14:09)  HR: 89 (25 @ 09:46) (89 - 95)  BP: 127/77 (25 @ 09:46) (117/72 - 127/77)  RR: 16 (25 @ 09:46) (16 - 18)  SpO2: 97% (25 @ 09:46) (97% - 97%)    General: NAD; Lying comfortably in bed  Pulm: No increased work of breathing noted.   Abdomen: Soft, nontender, gravid. Palpable contractions q6-8 minutes apart, irregular  Extremities: No calf tenderness or swelling noted bilaterally.   SVE: 3-4/70/-3 w/ membranes palpated on exam.     NST: Cat I tracing. Baseline 135bpm. Moderate variability. +accels, no decels noted  [place]: Irregular contractions  TAUS: Cephalic presentation, anterior placenta. ANDREZ 10.8cm. BPP . Copy of ultrasound in patient chart.     A/P: 35y  at 37w4d presenting w/ painful contractions x 48 hours. VE now 3-4/70/-3 although patient not desiring analgesia at this time. Maternal and fetal status reassuring  - Fetal status reassuring on NST and BPP   - Plan to observe for 2 hours and re-examine. If patient has made cervical change or if patient desires pain control, consider admission.       D/w Dr. Gonzalez, attending  Cora Mejias PA-C            MCKENNA [id number]  S: 35yFemale  at 37w4d presenting due to painful contractions x 48 hours. Pt reports she was here in triage yesterday for the same reason, w/ contractions 8-12 minutes apart. Pt reports she was sent home when her cervical exam was unchanged and presented again today when contractions are now 4-5 minutes apart and more painful.   Of note, blood pressure noted in triage yesterday to be elevated, patients arm was bent as repeat BP was WNL.     Reports +FM, no LOF/VB/CTX. Denies HA, vision changes, RUQ/epigastric pain.     Ante: Spontaneous pregnancy. NIPT and anatomy scan wnl. Passed GCT. Repeated GCT in the setting of macrosomia, failed GCT and passed GTT. Denies elevated blood pressures in this pregnancy.  [place] : EFW 3708 g >99%, AC >99%  GBS positive    OBHX:  G1 -  TOP  G2 - Current  GynHX: Hx of abnormal Pap smear (s/p LEEP in ). Denies fibroids, ovarian cysts, STI/herpes.   MedHX: denies  Surghx: denies  Medications: denies  Allergies: NKDA      PE  T(C): 36.5 (25 @ 09:46), Max: 36.7 (25 @ 14:09)  HR: 89 (25 @ 09:46) (89 - 95)  BP: 127/77 (25 @ 09:46) (117/72 - 127/77)  RR: 16 (25 @ 09:46) (16 - 18)  SpO2: 97% (25 @ 09:46) (97% - 97%)    General: NAD; Lying comfortably in bed  Pulm: No increased work of breathing noted.   Abdomen: Soft, nontender, gravid. Palpable contractions q6-8 minutes apart, irregular  Extremities: No calf tenderness or swelling noted bilaterally.   SVE: 3-4/70/-3 w/ membranes palpated on exam.     NST: Cat I tracing. Baseline 135bpm. Moderate variability. +accels, no decels noted  [place]: Irregular contractions  TAUS: Cephalic presentation, anterior placenta. ANDREZ 10.8cm. BPP . Copy of ultrasound in patient chart.     A/P: 35y  at 37w4d presenting w/ painful contractions x 48 hours. VE now 3-4/70/-3 although patient not desiring analgesia at this time. Maternal and fetal status reassuring  - Fetal status reassuring on NST and BPP   - Plan to observe for 2 hours and re-examine. If patient has made cervical change or if patient desires pain control, consider admission.       D/w Dr. Gonzalez, attending  Cora Mejias PA-C       Addendum:     Pt seen and examined at bedside. Pt still reports painful contractions. Notes that they are 5-10 minutes apart. Pt still does not desire an epidural at this time. VE performed and noted to be 4.5/80/-3 w/ palpable membranes.   - Pt informed of continued early labor. Pt declining epidural at this time.   - Pt opting to go for a walk/lunch and come back in 4-6 hours for repeat VE and to consider epidural at that time. Pt understands that prior to 39 weeks, limited options for labor augmentation/induction. At this time, no indication for augmentation or induction.   - Return precautions given- return if reduced FM, LOF, VB, worsening contractions.     Plan discussed w/ Dr Carlos Mejias PA-C

## 2025-05-30 NOTE — OB RN TRIAGE NOTE - IN ACCORDANCE WITH [place] STATE LAW, WE OFFER EVERY PATIENT A HEPATITIS C TEST. WOULD YOU LIKE TO BE TESTED TODAY?
How Acid Reflux Affects Your Throat    Do you have to clear your throat or cough often? Are you hoarse? Do you have trouble swallowing? If you have these or other throat symptoms, you may have acid reflux.  This occurs when stomach acid flows back up and
Opt out

## 2025-05-31 ENCOUNTER — INPATIENT (INPATIENT)
Facility: HOSPITAL | Age: 35
LOS: 3 days | Discharge: HOME | End: 2025-06-04
Attending: OBSTETRICS & GYNECOLOGY | Admitting: OBSTETRICS & GYNECOLOGY
Payer: COMMERCIAL

## 2025-05-31 VITALS
HEART RATE: 84 BPM | OXYGEN SATURATION: 97 % | DIASTOLIC BLOOD PRESSURE: 68 MMHG | SYSTOLIC BLOOD PRESSURE: 118 MMHG | TEMPERATURE: 98 F | RESPIRATION RATE: 17 BRPM

## 2025-05-31 DIAGNOSIS — O26.899 OTHER SPECIFIED PREGNANCY RELATED CONDITIONS, UNSPECIFIED TRIMESTER: ICD-10-CM

## 2025-05-31 PROBLEM — Z78.9 OTHER SPECIFIED HEALTH STATUS: Chronic | Status: ACTIVE | Noted: 2025-05-29

## 2025-05-31 LAB
BASOPHILS # BLD AUTO: 0.05 K/UL — SIGNIFICANT CHANGE UP (ref 0–0.2)
BASOPHILS NFR BLD AUTO: 0.4 % — SIGNIFICANT CHANGE UP (ref 0–2)
BLD GP AB SCN SERPL QL: NEGATIVE — SIGNIFICANT CHANGE UP
EOSINOPHIL # BLD AUTO: 0.05 K/UL — SIGNIFICANT CHANGE UP (ref 0–0.5)
EOSINOPHIL NFR BLD AUTO: 0.4 % — SIGNIFICANT CHANGE UP (ref 0–6)
HCT VFR BLD CALC: 35.4 % — SIGNIFICANT CHANGE UP (ref 34.5–45)
HGB BLD-MCNC: 11.7 G/DL — SIGNIFICANT CHANGE UP (ref 11.5–15.5)
IMM GRANULOCYTES NFR BLD AUTO: 0.4 % — SIGNIFICANT CHANGE UP (ref 0–0.9)
LYMPHOCYTES # BLD AUTO: 28.8 % — SIGNIFICANT CHANGE UP (ref 13–44)
LYMPHOCYTES # BLD AUTO: 3.46 K/UL — HIGH (ref 1–3.3)
MCHC RBC-ENTMCNC: 29.3 PG — SIGNIFICANT CHANGE UP (ref 27–34)
MCHC RBC-ENTMCNC: 33.1 G/DL — SIGNIFICANT CHANGE UP (ref 32–36)
MCV RBC AUTO: 88.5 FL — SIGNIFICANT CHANGE UP (ref 80–100)
MONOCYTES # BLD AUTO: 1.13 K/UL — HIGH (ref 0–0.9)
MONOCYTES NFR BLD AUTO: 9.4 % — SIGNIFICANT CHANGE UP (ref 2–14)
NEUTROPHILS # BLD AUTO: 7.27 K/UL — SIGNIFICANT CHANGE UP (ref 1.8–7.4)
NEUTROPHILS NFR BLD AUTO: 60.6 % — SIGNIFICANT CHANGE UP (ref 43–77)
NRBC BLD AUTO-RTO: 0 /100 WBCS — SIGNIFICANT CHANGE UP (ref 0–0)
PLATELET # BLD AUTO: 331 K/UL — SIGNIFICANT CHANGE UP (ref 150–400)
RBC # BLD: 4 M/UL — SIGNIFICANT CHANGE UP (ref 3.8–5.2)
RBC # FLD: 14.8 % — HIGH (ref 10.3–14.5)
RH IG SCN BLD-IMP: POSITIVE — SIGNIFICANT CHANGE UP
RH IG SCN BLD-IMP: POSITIVE — SIGNIFICANT CHANGE UP
T PALLIDUM AB TITR SER: NEGATIVE — SIGNIFICANT CHANGE UP
WBC # BLD: 12.01 K/UL — HIGH (ref 3.8–10.5)
WBC # FLD AUTO: 12.01 K/UL — HIGH (ref 3.8–10.5)

## 2025-05-31 PROCEDURE — 88307 TISSUE EXAM BY PATHOLOGIST: CPT | Mod: 26

## 2025-05-31 PROCEDURE — 59510 CESAREAN DELIVERY: CPT

## 2025-05-31 RX ORDER — CITRIC ACID/SODIUM CITRATE 300-500 MG
30 SOLUTION, ORAL ORAL ONCE
Refills: 0 | Status: COMPLETED | OUTPATIENT
Start: 2025-05-31 | End: 2025-05-31

## 2025-05-31 RX ORDER — SODIUM CHLORIDE 9 G/1000ML
1000 INJECTION, SOLUTION INTRAVENOUS
Refills: 0 | Status: DISCONTINUED | OUTPATIENT
Start: 2025-05-31 | End: 2025-06-04

## 2025-05-31 RX ORDER — MODIFIED LANOLIN 100 %
1 CREAM (GRAM) TOPICAL EVERY 6 HOURS
Refills: 0 | Status: DISCONTINUED | OUTPATIENT
Start: 2025-05-31 | End: 2025-06-04

## 2025-05-31 RX ORDER — CEFAZOLIN SODIUM IN 0.9 % NACL 3 G/100 ML
2000 INTRAVENOUS SOLUTION, PIGGYBACK (ML) INTRAVENOUS ONCE
Refills: 0 | Status: COMPLETED | OUTPATIENT
Start: 2025-05-31 | End: 2025-05-31

## 2025-05-31 RX ORDER — SODIUM CHLORIDE 9 G/1000ML
1000 INJECTION, SOLUTION INTRAVENOUS
Refills: 0 | Status: DISCONTINUED | OUTPATIENT
Start: 2025-05-31 | End: 2025-05-31

## 2025-05-31 RX ORDER — OXYCODONE HYDROCHLORIDE 30 MG/1
5 TABLET ORAL ONCE
Refills: 0 | Status: DISCONTINUED | OUTPATIENT
Start: 2025-05-31 | End: 2025-06-04

## 2025-05-31 RX ORDER — IBUPROFEN 200 MG
600 TABLET ORAL EVERY 6 HOURS
Refills: 0 | Status: COMPLETED | OUTPATIENT
Start: 2025-05-31 | End: 2026-04-29

## 2025-05-31 RX ORDER — AZITHROMYCIN 250 MG
500 CAPSULE ORAL ONCE
Refills: 0 | Status: COMPLETED | OUTPATIENT
Start: 2025-05-31 | End: 2025-05-31

## 2025-05-31 RX ORDER — MAGNESIUM HYDROXIDE 400 MG/5ML
30 SUSPENSION ORAL
Refills: 0 | Status: DISCONTINUED | OUTPATIENT
Start: 2025-05-31 | End: 2025-06-04

## 2025-05-31 RX ORDER — ACETAMINOPHEN 500 MG/5ML
1000 LIQUID (ML) ORAL ONCE
Refills: 0 | Status: COMPLETED | OUTPATIENT
Start: 2025-05-31 | End: 2025-05-31

## 2025-05-31 RX ORDER — FENTANYL/BUPIVACAINE/NS/PF 2MCG/ML-.1
250 PLASTIC BAG, INJECTION (ML) INJECTION
Refills: 0 | Status: DISCONTINUED | OUTPATIENT
Start: 2025-05-31 | End: 2025-06-01

## 2025-05-31 RX ORDER — DIPHENHYDRAMINE HCL 12.5MG/5ML
25 ELIXIR ORAL EVERY 6 HOURS
Refills: 0 | Status: DISCONTINUED | OUTPATIENT
Start: 2025-05-31 | End: 2025-06-04

## 2025-05-31 RX ORDER — ENOXAPARIN SODIUM 100 MG/ML
40 INJECTION SUBCUTANEOUS EVERY 24 HOURS
Refills: 0 | Status: DISCONTINUED | OUTPATIENT
Start: 2025-06-01 | End: 2025-06-04

## 2025-05-31 RX ORDER — OXYCODONE HYDROCHLORIDE 30 MG/1
5 TABLET ORAL
Refills: 0 | Status: DISCONTINUED | OUTPATIENT
Start: 2025-05-31 | End: 2025-06-04

## 2025-05-31 RX ORDER — CITRIC ACID/SODIUM CITRATE 300-500 MG
15 SOLUTION, ORAL ORAL EVERY 6 HOURS
Refills: 0 | Status: DISCONTINUED | OUTPATIENT
Start: 2025-05-31 | End: 2025-05-31

## 2025-05-31 RX ORDER — SIMETHICONE 80 MG
80 TABLET,CHEWABLE ORAL EVERY 4 HOURS
Refills: 0 | Status: DISCONTINUED | OUTPATIENT
Start: 2025-05-31 | End: 2025-06-04

## 2025-05-31 RX ORDER — AMPICILLIN SODIUM 1 G/1
2 INJECTION, POWDER, FOR SOLUTION INTRAMUSCULAR; INTRAVENOUS ONCE
Refills: 0 | Status: COMPLETED | OUTPATIENT
Start: 2025-05-31 | End: 2025-05-31

## 2025-05-31 RX ORDER — OXYTOCIN-SODIUM CHLORIDE 0.9% IV SOLN 30 UNIT/500ML 30-0.9/5 UT/ML-%
167 SOLUTION INTRAVENOUS
Qty: 30 | Refills: 0 | Status: DISCONTINUED | OUTPATIENT
Start: 2025-05-31 | End: 2025-06-04

## 2025-05-31 RX ORDER — OXYTOCIN-SODIUM CHLORIDE 0.9% IV SOLN 30 UNIT/500ML 30-0.9/5 UT/ML-%
42 SOLUTION INTRAVENOUS
Qty: 30 | Refills: 0 | Status: DISCONTINUED | OUTPATIENT
Start: 2025-05-31 | End: 2025-06-04

## 2025-05-31 RX ORDER — OXYTOCIN-SODIUM CHLORIDE 0.9% IV SOLN 30 UNIT/500ML 30-0.9/5 UT/ML-%
SOLUTION INTRAVENOUS
Qty: 30 | Refills: 0 | Status: DISCONTINUED | OUTPATIENT
Start: 2025-05-31 | End: 2025-05-31

## 2025-05-31 RX ORDER — ACETAMINOPHEN 500 MG/5ML
975 LIQUID (ML) ORAL
Refills: 0 | Status: DISCONTINUED | OUTPATIENT
Start: 2025-05-31 | End: 2025-06-04

## 2025-05-31 RX ORDER — AMPICILLIN SODIUM 1 G/1
1 INJECTION, POWDER, FOR SOLUTION INTRAMUSCULAR; INTRAVENOUS EVERY 4 HOURS
Refills: 0 | Status: DISCONTINUED | OUTPATIENT
Start: 2025-05-31 | End: 2025-05-31

## 2025-05-31 RX ORDER — KETOROLAC TROMETHAMINE 30 MG/ML
30 INJECTION, SOLUTION INTRAMUSCULAR; INTRAVENOUS EVERY 6 HOURS
Refills: 0 | Status: DISCONTINUED | OUTPATIENT
Start: 2025-05-31 | End: 2025-06-02

## 2025-05-31 RX ORDER — ACETAMINOPHEN 500 MG/5ML
1000 LIQUID (ML) ORAL ONCE
Refills: 0 | Status: DISCONTINUED | OUTPATIENT
Start: 2025-05-31 | End: 2025-06-04

## 2025-05-31 RX ADMIN — Medication 1000 MILLIGRAM(S): at 22:30

## 2025-05-31 RX ADMIN — AMPICILLIN SODIUM 108 GRAM(S): 1 INJECTION, POWDER, FOR SOLUTION INTRAMUSCULAR; INTRAVENOUS at 05:40

## 2025-05-31 RX ADMIN — Medication 255 MILLIGRAM(S): at 19:27

## 2025-05-31 RX ADMIN — Medication 1 APPLICATION(S): at 19:27

## 2025-05-31 RX ADMIN — Medication 400 MILLIGRAM(S): at 21:50

## 2025-05-31 RX ADMIN — Medication 80 MILLIGRAM(S): at 23:40

## 2025-05-31 RX ADMIN — AMPICILLIN SODIUM 108 GRAM(S): 1 INJECTION, POWDER, FOR SOLUTION INTRAMUSCULAR; INTRAVENOUS at 10:00

## 2025-05-31 RX ADMIN — AMPICILLIN SODIUM 216 GRAM(S): 1 INJECTION, POWDER, FOR SOLUTION INTRAMUSCULAR; INTRAVENOUS at 01:42

## 2025-05-31 RX ADMIN — Medication 100 MILLIGRAM(S): at 18:53

## 2025-05-31 RX ADMIN — KETOROLAC TROMETHAMINE 30 MILLIGRAM(S): 30 INJECTION, SOLUTION INTRAMUSCULAR; INTRAVENOUS at 23:39

## 2025-05-31 RX ADMIN — OXYTOCIN-SODIUM CHLORIDE 0.9% IV SOLN 30 UNIT/500ML 2 MILLIUNIT(S)/MIN: 30-0.9/5 SOLUTION at 15:02

## 2025-05-31 RX ADMIN — Medication 30 MILLILITER(S): at 18:53

## 2025-05-31 RX ADMIN — SODIUM CHLORIDE 125 MILLILITER(S): 9 INJECTION, SOLUTION INTRAVENOUS at 01:38

## 2025-05-31 RX ADMIN — AMPICILLIN SODIUM 108 GRAM(S): 1 INJECTION, POWDER, FOR SOLUTION INTRAMUSCULAR; INTRAVENOUS at 18:00

## 2025-05-31 RX ADMIN — AMPICILLIN SODIUM 108 GRAM(S): 1 INJECTION, POWDER, FOR SOLUTION INTRAMUSCULAR; INTRAVENOUS at 14:03

## 2025-05-31 NOTE — OB PROVIDER LABOR PROGRESS NOTE - ASSESSMENT
Patient evaluated at bedside. SVE unchanged. Discussed unchanged exam, persistent Cat II tracing and temperature. Reviewed recommendation for C section. Patient amenable. Risks, benefits and alternatives discussed. Informed consent obtained. Nursing and anesthesia aware. On call to OR. 
reassuring fetal status
Given unchanged exam, will start pitocin. Patient to be repositioned to left lateral. All questions answered. 
p0 at 37w6d in labor. gbs +, arom'd- clear, now in active labor.   cat 1 tracing

## 2025-05-31 NOTE — OB PROVIDER LABOR PROGRESS NOTE - NS_SUBJECTIVE/OBJECTIVE_OBGYN_ALL_OB_FT
Pt febrile to 100.8 at 16:15. Giving ofirimev, will recheck temperature in 30min. D/w Dr. Prakash. Persistent Cat II tracing with variable decels, continue resuscitation with amnioinfusion, overall reassuring with moderate variability.

## 2025-05-31 NOTE — OB RN PATIENT PROFILE - NS_PRENATALLABSOURCEHEPATITISC_OBGYN_ALL_OB
Detail Level: Detailed
Show Applicator Variable?: Yes
Render Note In Bullet Format When Appropriate: No
Consent: The patient's consent was obtained including but not limited to risks of crusting, scabbing, blistering, scarring, darker or lighter pigmentary change, recurrence, incomplete removal and infection.
Post-Care Instructions: I reviewed with the patient in detail post-care instructions. Patient is to wear sunprotection, and avoid picking at any of the treated lesions. Pt may apply Vaseline to crusted or scabbing areas.
Duration Of Freeze Thaw-Cycle (Seconds): 3
hard copy, drawn during this pregnancy

## 2025-05-31 NOTE — OB RN TRIAGE NOTE - NS_ARRIVALFROM_OBGYN_ALL_OB
Pt requesting refill on alprazolam 0.25 mg. Tasked to Dr Matthew Hughes to advise on quantity refills.     Refill Protocol Appointment Criteria  · Appointment scheduled in the past 6 months or in the next 3 months  Recent Outpatient Visits            1 week ago [facility]

## 2025-05-31 NOTE — OB PROVIDER LABOR PROGRESS NOTE - NS_SUBJECTIVE/OBJECTIVE_OBGYN_ALL_OB_FT
Patient seen at bedside with variable decelerations despite resuscitation with amnioinfusion and repositioning. Fetal status overall remains reassuring with moderate variability. Pitocin paused. Discussed with patient concern that fetus has not tolerated contractions making it difficult to achieve adequate contractions for labor progression. She has not made change since this morning, remaining around 4-5cm dilated. She also had a temperature to 100.8F that resolved with Tylenol. We discussed potential indications for CS and patient is amenable. We then reviewed risks, benefits, and alternatives. She requests to speak with Dr. Prakash. Will continue to monitor closely. Dr. Prakash in house and will go to bedside shortly to discuss CS.

## 2025-05-31 NOTE — OB PROVIDER LABOR PROGRESS NOTE - NS_OBIHIFHRDETAILS_OBGYN_ALL_OB_FT
baseline 140/mod cherie/+accel/no decel. Cat I
Acceleration with exam; baseline 140 bpm, moderate variability, +accelerations, +intermittent variables
cat 1 tracing-arom-clear

## 2025-05-31 NOTE — OB PROVIDER H&P - HISTORY OF PRESENT ILLNESS
Patient is   35y  at 37w5d presenting for painful contractions since the . Now every 4 minutes. Requests epidural.     Ante: Spontaneous pregnancy. NIPT and anatomy scan wnl. Passed GCT. Repeated GCT in the setting of macrosomia, failed GCT and passed GTT. Denies elevated blood pressures in this pregnancy.  EFW: by [employer] [address].  [place] : EFW 3708 g >99%, AC >99%  GBS positive    OBHX:  G1 -  TOP   G2 - Current  GynHX: Hx of abnormal Pap smear (s/p LEEP in ). Denies fibroids, ovarian cysts, STI/herpes.   MedHX: denies  Surghx: denies  Medications: denies  Allergies: NKDA    Physical Exam:  T(C): 36.7 (25 @ 00:41), Max: 36.7 (25 @ 00:41)  HR: 84 (25 @ 00:41) (84 - 89)  BP: 118/68 (25 @ 00:41) (118/68 - 127/77)  RR: 17 (25 @ 00:41) (16 - 17)  SpO2: 97% (25 @ 00:41) (97% - 97%)    General: NAD  Pulm: no increased WOB  Abdomen: soft, gravid, nontender  Extremities: wnl     TAUS: Cephalic  VE: 5/50/-2     EFM: 140 bpm, mod variability, + accels, - decels; reactive and reassuring  [place]: no ctx       Patient is a 34 y/o  at 37w5d presenting in early labor      PLAN  - Admit to L&D  - Consent signed for VD, augmentation of labor with pitocin. Risk of LGA discussed with patient including shoulder dystocia, OASIS, operative delivery, CS, and increased maternal/fetal morbidity and mortality. Pt endorses understanding.  - NPO  - IV fluids and labs ordered  - GBS +, amp ordered for infection ppx  - Continuous EFM       Bharati Lawler, PGY 1  Discussed with Dr. Harris PGY3 and Dr. Wright

## 2025-05-31 NOTE — OB PROVIDER LABOR PROGRESS NOTE - NS_SUBJECTIVE/OBJECTIVE_OBGYN_ALL_OB_FT
Pt seen at bedside for variable decelerations. Resting comfortably with epidural. VE completed, 4-5/50/-2. IUPC placed for amnioinfusion.     EFM reviewed. Baseline 160, moderate variability, +accelerations, nonrecurrent variable decelerations.   Category II tracing. Low concern for fetal acid base status given moderate variability and accels.   Ctx q3min     Pitocin at 2  Vital signs reviewed. BPs normotensive  Will continue to monitor patient closely.  Dr. Prakash in house

## 2025-05-31 NOTE — PRE-ANESTHESIA EVALUATION ADULT - NSANTHPMHFT_GEN_ALL_CORE
OBHX:  G1 - 2024 TOP 2022  G2 - Current  GynHX: Hx of abnormal Pap smear (s/p LEEP in 2021). Denies fibroids, ovarian cysts, STI/herpes.   MedHX: denies  Surghx: denies  Medications: denies  Allergies: NKDA

## 2025-05-31 NOTE — OB PROVIDER LABOR PROGRESS NOTE - NS_SUBJECTIVE/OBJECTIVE_OBGYN_ALL_OB_FT
Patient re-examined by Dr. Prakash; found to be 3/50/-2.   EFM reviewed.  Baseline rate is 155 bpm, with moderate variability (intermittent areas of minimal variability), - accels, - decels  Ctx not well captured on toco  Cat I tracing  Continue to monitor labor progression  Plan for another exam by Dr. Prakash shortly

## 2025-05-31 NOTE — OB PROVIDER LABOR PROGRESS NOTE - NS_SUBJECTIVE/OBJECTIVE_OBGYN_ALL_OB_FT
Day team assuming care.  EFM reviewed.  Baseline rate is 155 bpm, mod cherie, + accels, - decels  Ctx irregular, 3-4 in 10 mins  Cat I tracing  Plan to re-examine around 1045

## 2025-05-31 NOTE — OB PROVIDER DELIVERY SUMMARY - AS DELIV COMPLICATIONS OB
abnormal active phase labor/abnormal fetal heart rate tracing/maternal fever/meconium stained fluid abnormal fetal heart rate tracing/maternal fever/meconium stained fluid

## 2025-05-31 NOTE — OB RN DELIVERY SUMMARY - NS_FETALMONITOR_OBGYN_ALL_OB
You should receive the results of any lab work ordered or performed today within 2 weeks of its completion.  If you do not receive notification of the results, contact our telephone nurses at [phone].     We will plan to perform your next Pap smear in 2023.  You will also be due for Cologuard testing at that time.      If necessary, I can easily review your pelvic ultrasound results when they are available in January of 2023.  Call my nurses at the above telephone number if you have any questions or concerns.   External [place]/External FHR External [place]/Internal [place]/External FHR

## 2025-05-31 NOTE — OB PROVIDER LABOR PROGRESS NOTE - NS_SUBJECTIVE/OBJECTIVE_OBGYN_ALL_OB_FT
Patient seen at bedside, decreased FHR around 110s however in setting of moving and was difficult to trace FHR. Pt re-examined as she was feeling wet, thought she had broke her water. Exam unchanged 5/50/-2, bulging bag noted.    Cat II tracing overall reassuring with mod variability     Epidural in situ.   Ampicillin for GBS prophylaxis, second bag around 5 am.

## 2025-05-31 NOTE — OB RN DELIVERY SUMMARY - NS_SEPSISRSKCALC_OBGYN_ALL_OB_FT
EOS calculated successfully. EOS Risk Factor: 0.5/1000 live births ([facility] national incidence); GA=37w5d; Temp=100.8; ROM=12.833; GBS='Positive'; Antibiotics='Broad spectrum antibiotics > 4 hrs prior to birth'

## 2025-05-31 NOTE — OB PROVIDER LABOR PROGRESS NOTE - NS_SUBJECTIVE/OBJECTIVE_OBGYN_ALL_OB_FT
145 mod cherie +accel -decel  ctx q2-3 min  cat I reassuring acid base status  continue with expectant management

## 2025-05-31 NOTE — OB RN PATIENT PROFILE - AS SC BRADEN NUTRITION

## 2025-05-31 NOTE — OB PROVIDER DELIVERY SUMMARY - NSSELHIDDEN_OBGYN_ALL_OB_FT
[NS_DeliveryAttending1_OBGYN_ALL_OB_FT:[id number]],[NS_DeliveryAssist1_OBGYN_ALL_OB_FT:[id number]],[NS_DeliveryRN_OBGYN_ALL_OB_FT:[id number]]

## 2025-05-31 NOTE — PRE-ANESTHESIA EVALUATION ADULT - NSANTHASARD_GEN_ALL_CORE
"Goal Outcome Evaluation:    BP (!) 142/97 (BP Location: Left arm, Patient Position: Sitting, Cuff Size: Adult Small)   Pulse 106   Temp 98.1  F (36.7  C) (Oral)   Resp 14   Ht 1.499 m (4' 11\")   Wt 49.9 kg (110 lb)   LMP 01/22/2023 (Exact Date)   SpO2 100%   BMI 22.22 kg/m      [id number]    Admitted for high blood glucose concerning KDA. Blood glucose stable this afternoon. Patient want to leave. Cross cover notified. Maxwell Johnson MD couldn't discharge her because of her medical condition (DKA) and primary team admitted her in patient. Tere Cifuentes MD endocrinology also didn't want to leave, BMP in 4 hours to monitor DKA. Patient however, pulled out her PIV by herself. She has been in the hospital many times that she thinks she knows how to stop or pull IVs. This RN educated her the risk for possible DKA again. Patient and her boyfriend didn't want to stay 4 hours for BMP result. Pt signed AMA paper and left the floor at 7:10 pm  " 2

## 2025-05-31 NOTE — OB PROVIDER LABOR PROGRESS NOTE - NS_SUBJECTIVE/OBJECTIVE_OBGYN_ALL_OB_FT
145 mod cherie -accel -decel  ctx q2-3 min  cat I reassuring acid base status  VE performed, 6/50/-2.   Continue with expectant management  2nd dose of ampicillin starting 145 mod cherie -accel -decel  ctx q2-3 min  cat I reassuring acid base status  VE performed, 6/50/-2. Bulging bag.  Continue with expectant management  2nd dose of ampicillin starting

## 2025-05-31 NOTE — OB PROVIDER DELIVERY SUMMARY - NSPROVIDERDELIVERYNOTE_OBGYN_ALL_OB_FT
35y  presents at 37 weeks 5 days with painful contractions requesting epidural. Patient received epidural and then was ruptured for clear fluid.  Despite ROM and augmentation with pitocin, patient did not progress.  Secondary to the development of meconium and persistent variable decelerations despite aminoinfusion, the decision was made to proceed with  delivery as the safest method of delivery. Low segment transverse  section performed with liveborn M  in the cephalic presentation. Body cord x1 reduced at delivery. Delayed cord clamping performed. Spontaneous delivery of an intact placenta.  Uterus closed in 1 layer.  1g TXA administered. Todd powder placed over closed hysterotomy. Fascia closed with 1 Vicryl. Subcutaneous tissue closed in multiple layers with 2-0 vicryl. Skin closed with 3-0 monocryl.     All counts correct. Mother and baby stable at the end of the delivery. EBL 1000 cc.   weight 4255 g - LGA fetus.     I was scrubbed and present for the entire delivery and accompanied the patient to the recovery room.     CARRI Prakash MD 35y  presents at 37 weeks 5 days with painful contractions requesting epidural. Patient received epidural and then was ruptured for clear fluid.  Despite ROM and augmentation with pitocin, patient did not progress.  Secondary to the development of meconium and persistent CAT II tracing with variable decelerations despite aminoinfusion, the decision was made to proceed with  delivery as the safest method of delivery. Low segment transverse  section performed with liveborn M  in the cephalic presentation. Body cord x1 reduced at delivery. Delayed cord clamping performed. Spontaneous delivery of an intact placenta.  Uterus closed in 1 layer.  1g TXA administered. Todd powder placed over closed hysterotomy. Fascia closed with 1 Vicryl. Subcutaneous tissue closed in multiple layers with 2-0 vicryl. Skin closed with 3-0 monocryl.     All counts correct. Mother and baby stable at the end of the delivery. EBL 1000 cc.   weight 4255 g - LGA fetus.     I was scrubbed and present for the entire delivery and accompanied the patient to the recovery room.     CARRI Prakash MD

## 2025-05-31 NOTE — OB RN PATIENT PROFILE - PAIN SCALE PREFERRED, PROFILE
Patient Seen in: [facility] Emergency Department    History   Patient presents with:   Eye Visual Problem (opthalmic)    Stated Complaint: Left eye swelling    HPI    11year-old child who is healthy who returned from her father's with left eye dian Pulmonary/Chest: Effort normal. No respiratory distress. Abdominal: Soft. There is no tenderness. There is no guarding. Musculoskeletal: Normal range of motion. No edema or tenderness.    Neurological: No gross focal deficits  Skin: Skin is warm and numerical 0-10

## 2025-06-01 LAB
BASOPHILS # BLD AUTO: 0 K/UL — SIGNIFICANT CHANGE UP (ref 0–0.2)
BASOPHILS NFR BLD AUTO: 0 % — SIGNIFICANT CHANGE UP (ref 0–2)
EOSINOPHIL # BLD AUTO: 0 K/UL — SIGNIFICANT CHANGE UP (ref 0–0.5)
EOSINOPHIL NFR BLD AUTO: 0 % — SIGNIFICANT CHANGE UP (ref 0–6)
HCT VFR BLD CALC: 28.4 % — LOW (ref 34.5–45)
HGB BLD-MCNC: 9.3 G/DL — LOW (ref 11.5–15.5)
LYMPHOCYTES # BLD AUTO: 13.2 % — SIGNIFICANT CHANGE UP (ref 13–44)
LYMPHOCYTES # BLD AUTO: 2.72 K/UL — SIGNIFICANT CHANGE UP (ref 1–3.3)
MCHC RBC-ENTMCNC: 29.2 PG — SIGNIFICANT CHANGE UP (ref 27–34)
MCHC RBC-ENTMCNC: 32.7 G/DL — SIGNIFICANT CHANGE UP (ref 32–36)
MCV RBC AUTO: 89.3 FL — SIGNIFICANT CHANGE UP (ref 80–100)
MONOCYTES # BLD AUTO: 0.72 K/UL — SIGNIFICANT CHANGE UP (ref 0–0.9)
MONOCYTES NFR BLD AUTO: 3.5 % — SIGNIFICANT CHANGE UP (ref 2–14)
NEUTROPHILS # BLD AUTO: 17.13 K/UL — HIGH (ref 1.8–7.4)
NEUTROPHILS NFR BLD AUTO: 83.3 % — HIGH (ref 43–77)
PLATELET # BLD AUTO: 257 K/UL — SIGNIFICANT CHANGE UP (ref 150–400)
RBC # BLD: 3.18 M/UL — LOW (ref 3.8–5.2)
RBC # FLD: 14.7 % — HIGH (ref 10.3–14.5)
WBC # BLD: 20.57 K/UL — HIGH (ref 3.8–10.5)
WBC # FLD AUTO: 20.57 K/UL — HIGH (ref 3.8–10.5)

## 2025-06-01 RX ADMIN — KETOROLAC TROMETHAMINE 30 MILLIGRAM(S): 30 INJECTION, SOLUTION INTRAMUSCULAR; INTRAVENOUS at 18:00

## 2025-06-01 RX ADMIN — Medication 975 MILLIGRAM(S): at 02:49

## 2025-06-01 RX ADMIN — Medication 975 MILLIGRAM(S): at 03:40

## 2025-06-01 RX ADMIN — Medication 975 MILLIGRAM(S): at 20:47

## 2025-06-01 RX ADMIN — Medication 975 MILLIGRAM(S): at 09:53

## 2025-06-01 RX ADMIN — KETOROLAC TROMETHAMINE 30 MILLIGRAM(S): 30 INJECTION, SOLUTION INTRAMUSCULAR; INTRAVENOUS at 07:26

## 2025-06-01 RX ADMIN — ENOXAPARIN SODIUM 40 MILLIGRAM(S): 100 INJECTION SUBCUTANEOUS at 09:53

## 2025-06-01 RX ADMIN — Medication 80 MILLIGRAM(S): at 07:46

## 2025-06-01 RX ADMIN — Medication 975 MILLIGRAM(S): at 21:00

## 2025-06-01 RX ADMIN — Medication 975 MILLIGRAM(S): at 14:33

## 2025-06-01 RX ADMIN — KETOROLAC TROMETHAMINE 30 MILLIGRAM(S): 30 INJECTION, SOLUTION INTRAMUSCULAR; INTRAVENOUS at 12:18

## 2025-06-01 RX ADMIN — KETOROLAC TROMETHAMINE 30 MILLIGRAM(S): 30 INJECTION, SOLUTION INTRAMUSCULAR; INTRAVENOUS at 23:42

## 2025-06-01 RX ADMIN — Medication 80 MILLIGRAM(S): at 20:51

## 2025-06-01 RX ADMIN — KETOROLAC TROMETHAMINE 30 MILLIGRAM(S): 30 INJECTION, SOLUTION INTRAMUSCULAR; INTRAVENOUS at 00:00

## 2025-06-01 NOTE — LACTATION INITIAL EVALUATION - NS LACT CON REASON FOR REQ
Dyad seen at 7hrs post birth. LGA infant under glucose monitoring protocol. Demonstrate hand expressing colostrum, after few minutes of try, small smear of expressible colostrum present. Place infant on Rt breast with football hold infant latched with short burst sucking,. Discuss  sucking behavior for first 24hrs of life and expected sucking changes. Discuss how to maintain and increase breast milk supply, how to monitor adequate feeding and output, and Triple feeding plan. Consult discussed with primary RN, will f/u as needed/primaparous mom

## 2025-06-01 NOTE — LACTATION INITIAL EVALUATION - LACTATION INTERVENTIONS
initiate/review safe skin-to-skin/initiate/review hand expression/initiate/review pumping guidelines and safe milk handling/initiate/review techniques for position and latch/post discharge community resources provided/initiate/review supplementation plan due to medical indications/initiate/review nipple shield use/review techniques to increase milk supply/review techniques to manage sore nipples/engorgement/review techniques for weaning/initiate/review finger suck/initiate/review breast massage/compression/initiate/review alternate feeding method/reviewed components of an effective feeding and at least 8 effective feedings per day required/reviewed importance of monitoring infant diapers, the breastfeeding log, and minimum output each day initiate/review safe skin-to-skin/initiate/review hand expression/initiate/review pumping guidelines and safe milk handling/initiate/review techniques for position and latch/post discharge community resources provided/initiate/review supplementation plan due to medical indications/initiate/review nipple shield use/review techniques to increase milk supply/review techniques to manage sore nipples/engorgement/review techniques for weaning/initiate/review finger suck/initiate/review breast massage/compression/initiate/review alternate feeding method/reviewed components of an effective feeding and at least 8 effective feedings per day required/reviewed importance of monitoring infant diapers, the breastfeeding log, and minimum output each day/reviewed risks of unnecessary formula supplementation/reviewed risks of artificial nipples/reviewed strategies to transition to breastfeeding only/reviewed benefits and recommendations for rooming in/reviewed feeding on demand/by cue at least 8 times a day/recommended follow-up with pediatrician within 24 hours of discharge/reviewed indications of inadequate milk transfer that would require supplementation

## 2025-06-02 ENCOUNTER — TRANSCRIPTION ENCOUNTER (OUTPATIENT)
Age: 35
End: 2025-06-02

## 2025-06-02 ENCOUNTER — APPOINTMENT (OUTPATIENT)
Dept: ANTEPARTUM | Facility: CLINIC | Age: 35
End: 2025-06-02

## 2025-06-02 DIAGNOSIS — O47.1 FALSE LABOR AT OR AFTER 37 COMPLETED WEEKS OF GESTATION: ICD-10-CM

## 2025-06-02 DIAGNOSIS — Z3A.37 37 WEEKS GESTATION OF PREGNANCY: ICD-10-CM

## 2025-06-02 RX ORDER — IBUPROFEN 200 MG
1 TABLET ORAL
Qty: 0 | Refills: 0 | DISCHARGE
Start: 2025-06-02

## 2025-06-02 RX ORDER — FERROUS SULFATE 137(45) MG
325 TABLET, EXTENDED RELEASE ORAL DAILY
Refills: 0 | Status: DISCONTINUED | OUTPATIENT
Start: 2025-06-02 | End: 2025-06-04

## 2025-06-02 RX ORDER — ACETAMINOPHEN 500 MG/5ML
3 LIQUID (ML) ORAL
Qty: 0 | Refills: 0 | DISCHARGE
Start: 2025-06-02

## 2025-06-02 RX ORDER — IBUPROFEN 200 MG
600 TABLET ORAL EVERY 6 HOURS
Refills: 0 | Status: DISCONTINUED | OUTPATIENT
Start: 2025-06-02 | End: 2025-06-04

## 2025-06-02 RX ADMIN — KETOROLAC TROMETHAMINE 30 MILLIGRAM(S): 30 INJECTION, SOLUTION INTRAMUSCULAR; INTRAVENOUS at 01:30

## 2025-06-02 RX ADMIN — ENOXAPARIN SODIUM 40 MILLIGRAM(S): 100 INJECTION SUBCUTANEOUS at 10:24

## 2025-06-02 RX ADMIN — Medication 600 MILLIGRAM(S): at 13:00

## 2025-06-02 RX ADMIN — Medication 975 MILLIGRAM(S): at 15:01

## 2025-06-02 RX ADMIN — Medication 600 MILLIGRAM(S): at 06:46

## 2025-06-02 RX ADMIN — Medication 325 MILLIGRAM(S): at 15:07

## 2025-06-02 RX ADMIN — KETOROLAC TROMETHAMINE 30 MILLIGRAM(S): 30 INJECTION, SOLUTION INTRAMUSCULAR; INTRAVENOUS at 07:30

## 2025-06-02 RX ADMIN — Medication 975 MILLIGRAM(S): at 15:07

## 2025-06-02 RX ADMIN — Medication 975 MILLIGRAM(S): at 10:24

## 2025-06-02 RX ADMIN — Medication 600 MILLIGRAM(S): at 17:30

## 2025-06-02 RX ADMIN — Medication 975 MILLIGRAM(S): at 02:32

## 2025-06-02 RX ADMIN — Medication 600 MILLIGRAM(S): at 06:18

## 2025-06-02 RX ADMIN — Medication 600 MILLIGRAM(S): at 17:43

## 2025-06-02 RX ADMIN — Medication 600 MILLIGRAM(S): at 23:01

## 2025-06-02 RX ADMIN — Medication 600 MILLIGRAM(S): at 12:16

## 2025-06-02 RX ADMIN — Medication 975 MILLIGRAM(S): at 03:15

## 2025-06-02 NOTE — DISCHARGE NOTE OB - FINANCIAL ASSISTANCE
[facility] provides services at a reduced cost to those who are determined to be eligible through [facility]’s financial assistance program. Information regarding [facility]’s financial assistance program can be found by going to [email or web address] or by calling [phone].

## 2025-06-02 NOTE — DISCHARGE NOTE OB - HOSPITAL COURSE
Admitted in early labor.  C/b NRFHT, delivered via primary  section.  Uncomplicated surgery and postoperative course.  Acute blood loss anemia noted on post-operative CBC.  Patient stable with normal vital signs.  No intervention necessary.

## 2025-06-02 NOTE — DISCHARGE NOTE OB - PATIENT PORTAL LINK FT
You can access the FollowMyHealth Patient Portal offered by [facility] by registering at the following website: [email or web address]. By joining [facility] [employer]’s FollowMyHealth portal, you will also be able to view your health information using other applications (apps) compatible with our system.

## 2025-06-02 NOTE — DISCHARGE NOTE OB - NS MD DC FALL RISK RISK
For information on Fall & Injury Prevention, visit: [email or web address] OR  [email or web address] OR  https://www.cdc.gov/steadi/patient.html

## 2025-06-02 NOTE — DISCHARGE NOTE OB - CARE PROVIDER_API CALL
Adia Hubbard  Obstetrics and Gynecology  [address]  [place] [address]  Phone: [phone]  Fax: [phone]  Follow Up Time: 2 weeks

## 2025-06-03 ENCOUNTER — APPOINTMENT (OUTPATIENT)
Dept: OBGYN | Facility: CLINIC | Age: 35
End: 2025-06-03

## 2025-06-03 RX ADMIN — Medication 600 MILLIGRAM(S): at 07:00

## 2025-06-03 RX ADMIN — Medication 600 MILLIGRAM(S): at 12:30

## 2025-06-03 RX ADMIN — Medication 975 MILLIGRAM(S): at 23:54

## 2025-06-03 RX ADMIN — Medication 600 MILLIGRAM(S): at 12:10

## 2025-06-03 RX ADMIN — Medication 975 MILLIGRAM(S): at 03:09

## 2025-06-03 RX ADMIN — Medication 600 MILLIGRAM(S): at 00:04

## 2025-06-03 RX ADMIN — Medication 975 MILLIGRAM(S): at 18:03

## 2025-06-03 RX ADMIN — ENOXAPARIN SODIUM 40 MILLIGRAM(S): 100 INJECTION SUBCUTANEOUS at 10:00

## 2025-06-03 RX ADMIN — Medication 975 MILLIGRAM(S): at 17:58

## 2025-06-03 RX ADMIN — Medication 325 MILLIGRAM(S): at 12:09

## 2025-06-03 RX ADMIN — Medication 1 APPLICATION(S): at 18:03

## 2025-06-03 RX ADMIN — Medication 975 MILLIGRAM(S): at 09:14

## 2025-06-03 RX ADMIN — Medication 975 MILLIGRAM(S): at 04:00

## 2025-06-03 RX ADMIN — Medication 600 MILLIGRAM(S): at 06:42

## 2025-06-03 RX ADMIN — Medication 600 MILLIGRAM(S): at 20:56

## 2025-06-03 RX ADMIN — Medication 975 MILLIGRAM(S): at 10:00

## 2025-06-04 VITALS
OXYGEN SATURATION: 99 % | SYSTOLIC BLOOD PRESSURE: 128 MMHG | TEMPERATURE: 98 F | DIASTOLIC BLOOD PRESSURE: 78 MMHG | HEART RATE: 66 BPM | RESPIRATION RATE: 18 BRPM

## 2025-06-04 PROCEDURE — 59050 FETAL MONITOR W/REPORT: CPT

## 2025-06-04 PROCEDURE — 88307 TISSUE EXAM BY PATHOLOGIST: CPT

## 2025-06-04 PROCEDURE — 86780 TREPONEMA PALLIDUM: CPT

## 2025-06-04 PROCEDURE — 86900 BLOOD TYPING SEROLOGIC ABO: CPT

## 2025-06-04 PROCEDURE — 85025 COMPLETE CBC W/AUTO DIFF WBC: CPT

## 2025-06-04 PROCEDURE — 86850 RBC ANTIBODY SCREEN: CPT

## 2025-06-04 PROCEDURE — 36415 COLL VENOUS BLD VENIPUNCTURE: CPT

## 2025-06-04 PROCEDURE — 86901 BLOOD TYPING SEROLOGIC RH(D): CPT

## 2025-06-04 RX ADMIN — Medication 600 MILLIGRAM(S): at 09:03

## 2025-06-04 RX ADMIN — ENOXAPARIN SODIUM 40 MILLIGRAM(S): 100 INJECTION SUBCUTANEOUS at 09:03

## 2025-06-04 RX ADMIN — Medication 975 MILLIGRAM(S): at 06:33

## 2025-06-04 RX ADMIN — Medication 975 MILLIGRAM(S): at 11:53

## 2025-06-04 RX ADMIN — Medication 325 MILLIGRAM(S): at 11:52

## 2025-06-04 RX ADMIN — Medication 600 MILLIGRAM(S): at 03:39

## 2025-06-04 RX ADMIN — Medication 975 MILLIGRAM(S): at 12:23

## 2025-06-04 RX ADMIN — Medication 600 MILLIGRAM(S): at 09:33

## 2025-06-04 NOTE — PROGRESS NOTE ADULT - ASSESSMENT
A/P: 35y s/p  section, POD#2, stable  -  Pain: PO motrin q6hrs, tylenol q8hrs, oxycodone for severe pain PRN  -  Post-operatively labs: post-op Hgb Hemoglobin: 9.3 g/dL (25 @ 07:15)  , hemodynamically stable, no symptoms of anemia   -  GI: tolerating regular diet, passing gas  -  : s/p coats , urinating without difficulty  -  DVT prophylaxis: encouraged increased ambulation, SCDs, Lovenox  -  Dispo: POD 3 or 4
A/P: 35y s/p  section, POD#3, stable  -  Pain: PO motrin q6hrs, tylenol q8hrs, oxycodone for severe pain PRN  -  Post-operatively labs: post-op Hgb 9.3, hemodynamically stable, no symptoms of anemia   -  GI: tolerating regular diet, passing gas  -  : s/p coats , urinating without difficulty  -  DVT prophylaxis: encouraged increased ambulation, SCDs, Lovenox  -  Dispo: POD 3 or 4
35y Female POD#1  s/p C/S, Uncomplicated                                       - Neuro/Pain:  toradol atc, tylenol atc, oxy prn  - CV:  VS per routine, AM CBC pending  - Pulm: Encourage ISS & Ambulation  - GI: Advance as tolerated  - : Pringle in place, to be removed this morning, TOV this afternoon  - DVT ppx: SCDs, Lovenox 40mg QD  - Dispo: POD #3/4
A/P: 35y s/p  section, POD#4, stable  -  Pain: PO motrin q6hrs, tylenol q8hrs, oxycodone for severe pain PRN  -  Post-operatively labs: post-op Hgb 9.3, hemodynamically stable, no symptoms of anemia   -  GI: tolerating regular diet, passing gas  -  : s/p coats , urinating without difficulty  -  DVT prophylaxis: encouraged increased ambulation, SCDs, Lovenox  -  Dispo: POD4

## 2025-06-04 NOTE — PROGRESS NOTE ADULT - ATTENDING COMMENTS
Patient reports that she feels much improved. She is ambulating without issue, voiding spontaneously, passing gas and tolerating regular diet. She denies fevers, chills, SOB, sore throat, cough, abdominal pain or any other complaints.     Vitals reviewed and are within normal limits. Appropriate physical exam- abdomen is soft and nontender with firm fundus below the umbilicus. Lochia is mild.     Continue with routine postpartum care. Patient stable for discharge with close follow up in the office.     Patient is in agreement with the plan and has no additional needs or questions at this time.     CARRI Prakash MD
Pt seen and evaluated by me this AM. In brief, pt is a 38 yo P1 now POD2 s/p pCS at 37w6d for NRFHT. Pt reports feeling well today, is ambulating, voiding, tolerating PO, passing flatus. Denies HA, CP, SOB, N/V.     VSS.     Hgb 11.7 > 9.3    Exam:   Gen: NAD  CV: Clinically well perfused   Pulm: No increased work of breathing   Abd: Soft, appropriately tender, fundus firm, incision c/d/i  : Lochia mild   Ext: Symmetric, no swelling.     Plan for iron supplementation. Otherwise for routine postoperative care.
Patient reports that she feels tired but well. No concerns. Has not yet ambulated much around the room but is going to. Nausea has resolved and she is able to eat. She denies fevers, chills, SOB, sore throat, cough, abdominal pain or any other complaints.     Vitals reviewed and are within normal limits. Patient appears comfortable. Physical exam deferred as per patient given guests present.     Continue with routine postpartum care.     Patient is in agreement with the plan and has no additional needs or questions at this time.     CARRI Prakash MD

## 2025-06-04 NOTE — PROGRESS NOTE ADULT - SUBJECTIVE AND OBJECTIVE BOX
Patient evaluated at bedside this morning, resting comfortable in bed.   She reports pain is well controlled.  She denies headache, dizziness, chest pain, palpitations, shortness of breath, nausea, vomiting or heavy vaginal bleeding.  She has been ambulating without assistance, voiding spontaneously, passing gas, tolerating regular diet and is breastfeeding.    Physical Exam:  Vital Signs Last 24 Hrs  T(C): 36.4 (04 Jun 2025 06:46), Max: 37.1 (03 Jun 2025 18:00)  T(F): 97.6 (04 Jun 2025 06:46), Max: 98.7 (03 Jun 2025 18:00)  HR: 70 (04 Jun 2025 06:46) (64 - 81)  BP: 112/74 (04 Jun 2025 06:46) (106/69 - 114/69)  BP(mean): --  RR: 18 (04 Jun 2025 06:46) (18 - 18)  SpO2: 96% (04 Jun 2025 06:46) (96% - 99%)    Parameters below as of 04 Jun 2025 06:46  Patient On (Oxygen Delivery Method): room air        GA: NAD, A+0 x 3  Abd: soft, nontender, nondistended, no rebound or guarding, incision clean, dry and intact, uterus firm at midline and below umbilicus  : lochia WNL  Extremities: 1+ pitting edema to b/l shins; no calf tenderness                   
Patient evaluated at bedside this morning, resting comfortable in bed.   She reports pain is well controlled.  She denies headache, dizziness, chest pain, palpitations, shortness of breathe, nausea, vomiting or heavy vaginal bleeding.  She has been ambulating without assistance, voiding spontaneously, passing gas, tolerating regular diet and is breastfeeding.    Physical Exam:  Vital Signs Last 24 Hrs  T(C): 36.7 (03 Jun 2025 06:26), Max: 36.9 (02 Jun 2025 14:22)  T(F): 98.1 (03 Jun 2025 06:26), Max: 98.5 (02 Jun 2025 18:05)  HR: 68 (03 Jun 2025 06:26) (68 - 90)  BP: 113/73 (03 Jun 2025 06:26) (104/71 - 113/73)  BP(mean): --  RR: 17 (03 Jun 2025 06:26) (17 - 19)  SpO2: 97% (03 Jun 2025 06:26) (95% - 99%)    Parameters below as of 03 Jun 2025 06:26  Patient On (Oxygen Delivery Method): room air        GA: NAD, A+0 x 3  Abd: soft, nontender, nondistended, no rebound or guarding, incision clean, dry and intact, uterus firm at midline and below umbilicus  : lochia WNL  Extremities: no swelling or calf tenderness                             9.3    20.57 )-----------( 257      ( 01 Jun 2025 07:15 )             28.4               
Patient evaluated at bedside this morning, resting comfortable in bed, with no acute events overnight.  She reports pain is well controlled with oral pain medications.   She denies headache, dizziness, chest pain, shortness of breath, vomiting or heavy vaginal bleeding.  She has not tried ambulating since procedure, coats remains in place at this time. Tolerating diet.    Physical Exam:  Vital Signs Last 24 Hrs  T(C): 36.7 (01 Jun 2025 06:00), Max: 38.2 (31 May 2025 16:15)  T(F): 98 (01 Jun 2025 06:00), Max: 100.8 (31 May 2025 16:15)  HR: 78 (01 Jun 2025 06:00) (71 - 96)  BP: 105/68 (01 Jun 2025 06:00) (103/67 - 148/68)  BP(mean): --  RR: 18 (01 Jun 2025 06:00) (17 - 18)  SpO2: 95% (01 Jun 2025 06:00) (95% - 99%)    Parameters below as of 31 May 2025 22:32  Patient On (Oxygen Delivery Method): room air        GA: NAD, A+0 x 3  Pulm: no increased WOB  Abd: soft, nontender, mildly distended, no rebound or guarding, incision clean, dry and intact, uterus firm at midline, 2 fb below umbilicus  : coats in situ, lochia WNL  Extremities: mild lower extremity edema, no calf tenderness, SCDs in place                            11.7   12.01 )-----------( 331      ( 31 May 2025 01:27 )             35.4               acetaminophen     Tablet .. 975 milliGRAM(s) Oral <User Schedule>  acetaminophen   IVPB .. 1000 milliGRAM(s) IV Intermittent once  chlorhexidine 2% Cloths 1 Application(s) Topical daily  diphenhydrAMINE 25 milliGRAM(s) Oral every 6 hours PRN  diphtheria/tetanus/pertussis (acellular) Vaccine (Adacel) 0.5 milliLiter(s) IntraMuscular once  enoxaparin Injectable 40 milliGRAM(s) SubCutaneous every 24 hours  ibuprofen  Tablet. 600 milliGRAM(s) Oral every 6 hours  ketorolac   Injectable 30 milliGRAM(s) IV Push every 6 hours  lactated ringers. 1000 milliLiter(s) IV Continuous <Continuous>  lactated ringers. 1000 milliLiter(s) IV Continuous <Continuous>  lanolin Ointment 1 Application(s) Topical every 6 hours PRN  magnesium hydroxide Suspension 30 milliLiter(s) Oral two times a day PRN  oxyCODONE    IR 5 milliGRAM(s) Oral every 3 hours PRN  oxyCODONE    IR 5 milliGRAM(s) Oral once PRN  oxytocin Infusion 42 milliUNIT(s)/Min IV Continuous <Continuous>  oxytocin Infusion 167 milliUNIT(s)/Min IV Continuous <Continuous>  simethicone 80 milliGRAM(s) Chew every 4 hours PRN  
Patient evaluated at bedside this morning, resting comfortable in bed.   She reports pain is well controlled.  She denies headache, dizziness, chest pain, palpitations, shortness of breath, nausea, vomiting or heavy vaginal bleeding.  She has been ambulating without assistance, voiding spontaneously, passing gas, tolerating regular diet and is breastfeeding.    Physical Exam:  Vital Signs Last 24 Hrs  T(C): 36.9 (02 Jun 2025 06:00), Max: 37.1 (01 Jun 2025 22:00)  T(F): 98.4 (02 Jun 2025 06:00), Max: 98.8 (01 Jun 2025 22:00)  HR: 67 (02 Jun 2025 06:00) (67 - 82)  BP: 108/73 (02 Jun 2025 06:00) (95/63 - 108/73)  BP(mean): --  RR: 16 (02 Jun 2025 06:00) (16 - 18)  SpO2: 99% (02 Jun 2025 06:00) (95% - 100%)    Parameters below as of 02 Jun 2025 06:00  Patient On (Oxygen Delivery Method): room air        GA: NAD, A+0 x 3  Abd: soft, nontender, nondistended, no rebound or guarding, incision clean, dry and intact, uterus firm at midline and below umbilicus  : lochia WNL  Extremities: trace LE edema b/l; no calf tenderness                             9.3    20.57 )-----------( 257      ( 01 Jun 2025 07:15 )             28.4

## 2025-06-11 DIAGNOSIS — O26.893 OTHER SPECIFIED PREGNANCY RELATED CONDITIONS, THIRD TRIMESTER: ICD-10-CM

## 2025-06-11 DIAGNOSIS — O36.63X0 MATERNAL CARE FOR EXCESSIVE FETAL GROWTH, THIRD TRIMESTER, NOT APPLICABLE OR UNSPECIFIED: ICD-10-CM

## 2025-06-11 DIAGNOSIS — Z3A.37 37 WEEKS GESTATION OF PREGNANCY: ICD-10-CM

## 2025-06-17 ENCOUNTER — APPOINTMENT (OUTPATIENT)
Dept: OBGYN | Facility: CLINIC | Age: 35
End: 2025-06-17

## 2025-06-17 VITALS
HEIGHT: 59 IN | OXYGEN SATURATION: 97 % | DIASTOLIC BLOOD PRESSURE: 81 MMHG | SYSTOLIC BLOOD PRESSURE: 160 MMHG | HEART RATE: 78 BPM

## 2025-06-17 VITALS — SYSTOLIC BLOOD PRESSURE: 140 MMHG | DIASTOLIC BLOOD PRESSURE: 78 MMHG

## 2025-06-17 PROCEDURE — 0503F POSTPARTUM CARE VISIT: CPT

## 2025-07-02 ENCOUNTER — APPOINTMENT (OUTPATIENT)
Dept: OBGYN | Facility: CLINIC | Age: 35
End: 2025-07-02

## 2025-07-02 VITALS — DIASTOLIC BLOOD PRESSURE: 88 MMHG | OXYGEN SATURATION: 97 % | SYSTOLIC BLOOD PRESSURE: 127 MMHG | HEART RATE: 81 BPM

## 2025-07-02 PROCEDURE — 0503F POSTPARTUM CARE VISIT: CPT

## 2025-07-12 PROBLEM — Z98.891 S/P PRIMARY LOW TRANSVERSE C-SECTION: Status: ACTIVE | Noted: 2025-07-12

## 2025-07-12 PROBLEM — O99.810 ABNORMAL GLUCOSE AFFECTING PREGNANCY: Status: RESOLVED | Noted: 2025-04-25 | Resolved: 2025-07-12

## 2025-07-12 PROBLEM — Z13.71 SCREENING FOR GENETIC DISEASE CARRIER STATUS: Status: RESOLVED | Noted: 2024-10-28 | Resolved: 2025-07-12

## 2025-07-12 PROBLEM — N92.6 MISSED MENSES: Status: RESOLVED | Noted: 2024-03-10 | Resolved: 2025-07-12

## 2025-07-12 PROBLEM — Z34.93 THIRD TRIMESTER PREGNANCY: Status: RESOLVED | Noted: 2025-05-21 | Resolved: 2025-07-12

## 2025-07-12 PROBLEM — Z13.1 SPECIAL SCREENING EXAMINATION FOR DIABETES MELLITUS: Status: RESOLVED | Noted: 2025-02-26 | Resolved: 2025-07-12

## 2025-07-12 PROBLEM — Z32.00 VISIT FOR CONFIRMATION OF PREGNANCY TEST RESULT WITH PHYSICAL EXAM: Status: RESOLVED | Noted: 2024-03-10 | Resolved: 2025-07-12

## 2025-07-12 PROBLEM — Z34.02 NORMAL FIRST PREGNANCY IN SECOND TRIMESTER: Status: RESOLVED | Noted: 2025-01-14 | Resolved: 2025-07-12

## 2025-08-04 DIAGNOSIS — N61.0 MASTITIS WITHOUT ABSCESS: ICD-10-CM

## 2025-08-04 RX ORDER — DICLOXACILLIN SODIUM 500 MG/1
500 CAPSULE ORAL 4 TIMES DAILY
Qty: 40 | Refills: 0 | Status: ACTIVE | COMMUNITY
Start: 2025-08-04 | End: 1900-01-01

## 2025-08-07 ENCOUNTER — TRANSCRIPTION ENCOUNTER (OUTPATIENT)
Age: 35
End: 2025-08-07

## 2025-08-11 ENCOUNTER — APPOINTMENT (OUTPATIENT)
Dept: OBGYN | Facility: CLINIC | Age: 35
End: 2025-08-11